# Patient Record
Sex: FEMALE | Race: WHITE | NOT HISPANIC OR LATINO
[De-identification: names, ages, dates, MRNs, and addresses within clinical notes are randomized per-mention and may not be internally consistent; named-entity substitution may affect disease eponyms.]

---

## 2021-10-11 PROBLEM — Z00.00 ENCOUNTER FOR PREVENTIVE HEALTH EXAMINATION: Status: ACTIVE | Noted: 2021-10-11

## 2021-11-30 ENCOUNTER — APPOINTMENT (OUTPATIENT)
Dept: RHEUMATOLOGY | Facility: CLINIC | Age: 71
End: 2021-11-30
Payer: COMMERCIAL

## 2021-11-30 ENCOUNTER — NON-APPOINTMENT (OUTPATIENT)
Age: 71
End: 2021-11-30

## 2021-11-30 VITALS
HEART RATE: 72 BPM | DIASTOLIC BLOOD PRESSURE: 80 MMHG | WEIGHT: 127 LBS | BODY MASS INDEX: 25.27 KG/M2 | RESPIRATION RATE: 15 BRPM | SYSTOLIC BLOOD PRESSURE: 120 MMHG | OXYGEN SATURATION: 97 % | HEIGHT: 59.5 IN | TEMPERATURE: 97.2 F

## 2021-11-30 DIAGNOSIS — M25.511 PAIN IN RIGHT SHOULDER: ICD-10-CM

## 2021-11-30 DIAGNOSIS — M65.9 SYNOVITIS AND TENOSYNOVITIS, UNSPECIFIED: ICD-10-CM

## 2021-11-30 DIAGNOSIS — S73.192S OTHER SPRAIN OF LEFT HIP, SEQUELA: ICD-10-CM

## 2021-11-30 PROCEDURE — 99204 OFFICE O/P NEW MOD 45 MIN: CPT

## 2021-12-01 PROBLEM — S73.192S TEAR OF LEFT ACETABULAR LABRUM, SEQUELA: Status: ACTIVE | Noted: 2021-12-01

## 2021-12-01 RX ORDER — AZELASTINE HYDROCHLORIDE 137 UG/1
0.1 SPRAY, METERED NASAL
Qty: 30 | Refills: 0 | Status: COMPLETED | COMMUNITY
Start: 2021-07-07

## 2021-12-01 RX ORDER — PANTOPRAZOLE 40 MG/1
40 TABLET, DELAYED RELEASE ORAL
Qty: 90 | Refills: 0 | Status: ACTIVE | COMMUNITY
Start: 2021-11-10

## 2021-12-01 RX ORDER — METHYLPREDNISOLONE 4 MG/1
4 TABLET ORAL
Qty: 90 | Refills: 0 | Status: DISCONTINUED | COMMUNITY
Start: 2021-10-19

## 2021-12-01 RX ORDER — METOPROLOL SUCCINATE 25 MG/1
25 TABLET, EXTENDED RELEASE ORAL
Qty: 270 | Refills: 0 | Status: ACTIVE | COMMUNITY
Start: 2021-11-10

## 2021-12-01 RX ORDER — LEVOTHYROXINE SODIUM 50 UG/1
50 TABLET ORAL
Qty: 90 | Refills: 0 | Status: ACTIVE | COMMUNITY
Start: 2021-11-11

## 2021-12-01 RX ORDER — ALPRAZOLAM 0.25 MG/1
0.25 TABLET ORAL
Qty: 45 | Refills: 0 | Status: ACTIVE | COMMUNITY
Start: 2021-11-02

## 2021-12-01 RX ORDER — DULOXETINE HYDROCHLORIDE 30 MG/1
30 CAPSULE, DELAYED RELEASE PELLETS ORAL
Qty: 90 | Refills: 0 | Status: ACTIVE | COMMUNITY
Start: 2021-10-19

## 2021-12-01 RX ORDER — DENOSUMAB 60 MG/ML
60 INJECTION SUBCUTANEOUS
Qty: 1 | Refills: 0 | Status: ACTIVE | COMMUNITY
Start: 2021-09-09

## 2021-12-01 RX ORDER — METOPROLOL SUCCINATE 200 MG/1
200 TABLET, EXTENDED RELEASE ORAL
Qty: 30 | Refills: 0 | Status: ACTIVE | COMMUNITY
Start: 2021-11-17

## 2021-12-01 NOTE — ASSESSMENT
[FreeTextEntry1] : KRISHNA DÍAZ is a 71 year old woman who presents with chronic seronegative, reportedly nonerosive RA who has been maintained for the last 10 years on IV high dose Orencia (reportedly not responsive to lower doses) and low dose Medrol 2mg. Currently off Orencia x 3 months with some active synovitis. + steroid induced OP, s/p Prolia recently. + L hip pain s/p prior labral tear. \par \par - c/w medrol 2mg/day for now\par - reports XR this year, will get [facility] US now to eval for subclinical synovitis as she has not been as symptomatic as she expected being off orencia x 3 months but does have some synovitis -- perhaps we can try a lower dose of Orencia pending imaging. \par - check labs as below including pre immunosuppression labs: Hepatitis panel, Quantiferon \par - check Vit D, c/w dietary Ca goal 600mg BID with food, Vit D 2000 IU daily. C/w weight bearing exercise for 30min 3-4x/week to maintain BMD \par - will get DEXA for review\par - next Prolia due Spring 2022\par - RTC in 6-8 weeks to review

## 2021-12-01 NOTE — REVIEW OF SYSTEMS
[Negative] : Heme/Lymph [As Noted in HPI] : as noted in HPI [Arthralgias] : arthralgias [Joint Pain] : joint pain [Joint Swelling] : joint swelling [Joint Stiffness] : joint stiffness

## 2021-12-01 NOTE — HISTORY OF PRESENT ILLNESS
[FreeTextEntry1] : KRISHNA DÍAZ is a 71 year old woman who presents with prior dx of RA since age 27, presently on Medrol 2mg/day (chronically on this dose, has not been able to taper lower) and Orencia infusions x 10 years at dose of 1000mg, last dose 3 months as reports insurance has not approved the renewal of the PA. Reports poor response to SQ orencia and lower doses of IV orencia in the past. Denies any SE, no infectious sx with this dose. Denies any severe flares in thel last few years but does report she feels worsening arthralgias in the week just prior to infusions.  Currently reports all day stiffness and some arthralgias in R 3rd PIP, R shoulder, b/l wrists, b/l elbows, b/l knees. R 3rd PIP is more swollen than normal.  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 No personal fractures, no parental fx, no tobacco/ no ETOH / + chronic steroids/ + RA. \par Steady on feet, walking for exercise, dietary Ca, daily Vit D 2K. \par \par + L hip labral tear, some chronic MSK related pain, no limitation to ambulation \par + ocular migraines and some double vision in b/l eyes intermittently x 1 year -- never had inflammatory eye disease \par + ?"scarring on lung from RA" but never changed meds for this, no current pulmonary sx \par + b/l tinnitus \par

## 2021-12-01 NOTE — PHYSICAL EXAM
[General Appearance - Alert] : alert [General Appearance - In No Acute Distress] : in no acute distress [General Appearance - Well Nourished] : well nourished [Oriented To Time, Place, And Person] : oriented to person, place, and time [Impaired Insight] : insight and judgment were intact [Affect] : the affect was normal [Abnormal Walk] : normal gait [Nail Clubbing] : no clubbing  or cyanosis of the fingernails [Motor Tone] : muscle strength and tone were normal [Skin Color & Pigmentation] : normal skin color and pigmentation [Motor Exam] : the motor exam was normal [No Focal Deficits] : no focal deficits [Sclera] : the sclera and conjunctiva were normal [PERRL With Normal Accommodation] : pupils were equal in size, round, and reactive to light [Extraocular Movements] : extraocular movements were intact [Outer Ear] : the ears and nose were normal in appearance [Oropharynx] : the oropharynx was normal [Neck Appearance] : the appearance of the neck was normal [] : no respiratory distress [Auscultation Breath Sounds / Voice Sounds] : lungs were clear to auscultation bilaterally [Heart Rate And Rhythm] : heart rate was normal and rhythm regular [Heart Sounds] : normal S1 and S2 [Edema] : there was no peripheral edema [Bowel Sounds] : normal bowel sounds [Abdomen Soft] : soft [Abdomen Tenderness] : non-tender [No CVA Tenderness] : no ~M costovertebral angle tenderness [No Spinal Tenderness] : no spinal tenderness [FreeTextEntry1] : Synovitis over scattered PIPs in b/l hands, some TTP over MCPs and wrists, R shoulder TTP but ROM intact. OA changes in knees, no effusion. Some mild chronic RA deformities. L hip ROM painful but full

## 2022-01-18 ENCOUNTER — APPOINTMENT (OUTPATIENT)
Dept: RHEUMATOLOGY | Facility: CLINIC | Age: 72
End: 2022-01-18
Payer: COMMERCIAL

## 2022-01-18 VITALS
OXYGEN SATURATION: 98 % | HEART RATE: 70 BPM | RESPIRATION RATE: 14 BRPM | TEMPERATURE: 97.3 F | WEIGHT: 126 LBS | DIASTOLIC BLOOD PRESSURE: 71 MMHG | SYSTOLIC BLOOD PRESSURE: 107 MMHG | BODY MASS INDEX: 25.06 KG/M2 | HEIGHT: 59.5 IN

## 2022-01-18 PROCEDURE — 99213 OFFICE O/P EST LOW 20 MIN: CPT

## 2022-01-18 NOTE — PHYSICAL EXAM
[General Appearance - Alert] : alert [General Appearance - In No Acute Distress] : in no acute distress [General Appearance - Well Nourished] : well nourished [Sclera] : the sclera and conjunctiva were normal [PERRL With Normal Accommodation] : pupils were equal in size, round, and reactive to light [Extraocular Movements] : extraocular movements were intact [Outer Ear] : the ears and nose were normal in appearance [Oropharynx] : the oropharynx was normal [Neck Appearance] : the appearance of the neck was normal [Auscultation Breath Sounds / Voice Sounds] : lungs were clear to auscultation bilaterally [Heart Rate And Rhythm] : heart rate was normal and rhythm regular [Heart Sounds] : normal S1 and S2 [Edema] : there was no peripheral edema [Bowel Sounds] : normal bowel sounds [Abdomen Soft] : soft [Abdomen Tenderness] : non-tender [No CVA Tenderness] : no ~M costovertebral angle tenderness [No Spinal Tenderness] : no spinal tenderness [Abnormal Walk] : normal gait [Nail Clubbing] : no clubbing  or cyanosis of the fingernails [Motor Tone] : muscle strength and tone were normal [Skin Color & Pigmentation] : normal skin color and pigmentation [] : no rash [Motor Exam] : the motor exam was normal [No Focal Deficits] : no focal deficits [Oriented To Time, Place, And Person] : oriented to person, place, and time [Impaired Insight] : insight and judgment were intact [Affect] : the affect was normal [Murmurs] : no murmurs [FreeTextEntry1] : No synovitis today, fist ROM intact but  is slightly weak b/l. OA changes in knees, no effusion. Some mild chronic RA deformities. L hip ROM painful but full

## 2022-01-18 NOTE — ASSESSMENT
[FreeTextEntry1] : KRISHNA DÍAZ is a 71 year old woman who presents with chronic seronegative, reportedly nonerosive RA who has been maintained for the last 10 years on IV high dose Orencia (reportedly not responsive to lower doses) and low dose Medrol 2mg. Currently off Orencia, no active sx today, prior synovitis appears resolved. \par \par + steroid induced OP, s/p Prolia last in Oct 2021 \par + L hip pain s/p prior labral tear \par \par - c/w medrol 2mg/day for now\par - reviewed labs and ESR/CRP normal and given no progression of sx would not resume Orencia at present -- pt is amenable to this\par - had [facility]K US but report not sent over, will review as this may change above plan \par - c/w dietary Ca goal 600mg BID with food, Vit D 2000 IU daily. \par - C/w weight bearing exercise for 30min 3-4x/week to maintain BMD \par - will get most recent DEXA for review\par - next Prolia due after April 18, 2022 \par - RTC in 3 months for f/u and Prolia

## 2022-01-18 NOTE — REASON FOR VISIT
[Follow-Up: _____] : a [unfilled] follow-up visit [Initial Evaluation] : an initial evaluation [FreeTextEntry1] : RA, OP

## 2022-04-11 ENCOUNTER — EMERGENCY (EMERGENCY)
Facility: HOSPITAL | Age: 72
LOS: 1 days | Discharge: HOME | End: 2022-04-11
Attending: EMERGENCY MEDICINE | Admitting: EMERGENCY MEDICINE
Payer: COMMERCIAL

## 2022-04-11 VITALS
OXYGEN SATURATION: 94 % | TEMPERATURE: 98 F | SYSTOLIC BLOOD PRESSURE: 105 MMHG | DIASTOLIC BLOOD PRESSURE: 70 MMHG | HEART RATE: 70 BPM | RESPIRATION RATE: 18 BRPM

## 2022-04-11 VITALS
OXYGEN SATURATION: 97 % | WEIGHT: 123.02 LBS | HEART RATE: 70 BPM | HEIGHT: 59 IN | RESPIRATION RATE: 18 BRPM | TEMPERATURE: 98 F | SYSTOLIC BLOOD PRESSURE: 124 MMHG | DIASTOLIC BLOOD PRESSURE: 84 MMHG

## 2022-04-11 PROCEDURE — 73502 X-RAY EXAM HIP UNI 2-3 VIEWS: CPT | Mod: 26,LT

## 2022-04-11 PROCEDURE — 73562 X-RAY EXAM OF KNEE 3: CPT | Mod: 26,RT

## 2022-04-11 PROCEDURE — 70450 CT HEAD/BRAIN W/O DYE: CPT | Mod: 26,MA

## 2022-04-11 PROCEDURE — 73562 X-RAY EXAM OF KNEE 3: CPT

## 2022-04-11 PROCEDURE — 72100 X-RAY EXAM L-S SPINE 2/3 VWS: CPT

## 2022-04-11 PROCEDURE — 72125 CT NECK SPINE W/O DYE: CPT | Mod: MA

## 2022-04-11 PROCEDURE — 73110 X-RAY EXAM OF WRIST: CPT | Mod: 26,LT

## 2022-04-11 PROCEDURE — 72100 X-RAY EXAM L-S SPINE 2/3 VWS: CPT | Mod: 26

## 2022-04-11 PROCEDURE — 99284 EMERGENCY DEPT VISIT MOD MDM: CPT | Mod: 25

## 2022-04-11 PROCEDURE — 99285 EMERGENCY DEPT VISIT HI MDM: CPT

## 2022-04-11 PROCEDURE — 73502 X-RAY EXAM HIP UNI 2-3 VIEWS: CPT

## 2022-04-11 PROCEDURE — 72125 CT NECK SPINE W/O DYE: CPT | Mod: 26,MA

## 2022-04-11 PROCEDURE — 73110 X-RAY EXAM OF WRIST: CPT

## 2022-04-11 PROCEDURE — 70450 CT HEAD/BRAIN W/O DYE: CPT | Mod: MA

## 2022-04-11 RX ORDER — ACETAMINOPHEN 500 MG
650 TABLET ORAL ONCE
Refills: 0 | Status: COMPLETED | OUTPATIENT
Start: 2022-04-11 | End: 2022-04-11

## 2022-04-11 RX ORDER — IBUPROFEN 200 MG
400 TABLET ORAL ONCE
Refills: 0 | Status: COMPLETED | OUTPATIENT
Start: 2022-04-11 | End: 2022-04-11

## 2022-04-11 RX ADMIN — Medication 650 MILLIGRAM(S): at 22:09

## 2022-04-11 NOTE — ED ADULT NURSE NOTE - OBJECTIVE STATEMENT
Pt tripped over flat grown and landed on her Kness. Right knee hurts the most and left wrist. Pt hit  left side of face small bruise noted. Pt denies LOC, taking blood thinners.

## 2022-04-11 NOTE — ED PROVIDER NOTE - OBJECTIVE STATEMENT
Pt is a 71 yo female BIBEMS s/p fall pt tripped and fell at home goods while turning and hit left side of face hip wrist and right knee no loc no blood thinners no numbness tingling no weakness.

## 2022-04-11 NOTE — ED PROVIDER NOTE - NS ED ATTENDING STATEMENT MOD
This was a shared visit with the MARLENE. I reviewed and verified the documentation and independently performed the documented:

## 2022-04-11 NOTE — ED PROVIDER NOTE - CLINICAL SUMMARY MEDICAL DECISION MAKING FREE TEXT BOX
Pt is a 71 yo female s/p fall will ct head neck xray Tylenol for pain Pt is a 73 yo female s/p fall will ct head neck xray Tylenol for pain    DT: I have personally performed a face to face diagnostic evaluation on this patient.  I have reviewed the PA's note and agree with the history, exam, and plan of care, except as noted.  History and Exam by me shows Pt is a 73 yo female BIBEMS s/p fall pt tripped and fell at home goods while turning and hit left side of face hip wrist and right knee no loc no blood thinners no numbness tingling no weakness. .  Patient is NAD.  A n O x 3. Head NC/Left forehead quarter size ecchymosis.. Abd-soft, nt, no guarding, no rebound, no distension, no cva tenderness. Ext- Left wrist - limited rom due to pain and swelling on lateral aspect.  right knee- ecchymosis c limited rom.  left hip- from, nt, no erythema.   xrays prelim neg. cts were unremarkable.

## 2022-04-11 NOTE — ED PROVIDER NOTE - NSFOLLOWUPINSTRUCTIONS_ED_ALL_ED_FT
Follow up with orthopedics  elevate ice compress  tylenol for pain  returnt to er for any worsening symptoms    WRIST SPRAIN - General Information           Wrist Sprain    WHAT YOU NEED TO KNOW:    What is a wrist sprain? A wrist sprain happens when one or more ligaments in your wrist stretch or tear. Ligaments are tough tissues that connect bones and keep them in place, and support your joints.    What are the signs and symptoms of a wrist sprain?   •Swelling and tenderness      •Pain and stiffness      •Bruising or changes in skin color      •Popping sound in your wrist when you move it      How is a wrist sprain diagnosed? Your healthcare provider will ask how you injured your wrist. The provider will examine your wrist and hand and ask about your symptoms. You may need x-rays, an MRI, or a CT scan of your wrist. You may be given contrast liquid to help the pictures show up better. Tell the healthcare provider if you have ever had an allergic reaction to contrast liquid. Do not enter the MRI room with anything metal. Metal can cause serious injury. Tell the healthcare provider if you have any metal in or on your body.    How is a wrist sprain treated? Treatment depends on how severe your sprain is. You may need any of the following:   •NSAIDs, such as ibuprofen, help decrease swelling, pain, and fever. NSAIDs can cause stomach bleeding or kidney problems in certain people. If you take blood thinner medicine, always ask your healthcare provider if NSAIDs are safe for you. Always read the medicine label and follow directions.      •Acetaminophen decreases pain and fever. It is available without a doctor's order. Ask how much to take and how often to take it. Follow directions. Read the labels of all other medicines you are using to see if they also contain acetaminophen, or ask your doctor or pharmacist. Acetaminophen can cause liver damage if not taken correctly. Do not use more than 4 grams (4,000 milligrams) total of acetaminophen in one day.       •A splint or cast helps support your wrist and prevent more damage.      •Surgery may be needed if you have a severe sprain. Arthroscopy may be done to examine the inside of your wrist joint and repair ligament damage. Arthroscopy uses a scope that is inserted through a small incision. You may need open surgery to reconnect torn ligaments to the bone.      •Physical therapy may be recommended. A physical therapist teaches you exercises to help improve movement and strength, and to decrease pain.      How can I manage my symptoms?   •Rest your wrist for at least 48 hours. Avoid activities that cause pain.      •Ice your wrist for 15 to 20 minutes every hour or as directed. Use an ice pack, or put crushed ice in a plastic bag. Cover it with a towel before you put it on your wrist. Ice helps prevent tissue damage and decreases swelling and pain.      •Compress your wrist with an elastic bandage. This will help decrease swelling, support your wrist, and help it heal. Wear your wrist wrap as directed. The elastic bandage should be snug but not tight.  How to Wrap an Elastic Bandage           •Elevate your wrist above the level of your heart as often as you can. This will help decrease swelling and pain. Prop your wrist on pillows or blankets to keep it elevated comfortably.             When should I seek immediate care?   •You have severe pain or swelling.      •Your injured wrist is red or has red streaks spreading from the injured area.      •You have new trouble moving your hands, fingers, or wrist.      •Your wrist, hand, or fingers feel cold or numb.      •Your fingernails turn blue or gray.      When should I call my doctor?   •Your symptoms get worse.      •Your sprain does not get better within 2 weeks.      •You have questions or concerns about your condition or care.      CARE AGREEMENT:    You have the right to help plan your care. Learn about your health condition and how it may be treated. Discuss treatment options with your healthcare providers to decide what care you want to receive. You always have the right to refuse treatment.        © Copyright [employer] 2022           back to top                          © Copyright [employer] 2022 Follow up with orthopedics  elevate ice compress  tylenol for pain  return to er for any worsening symptoms    WRIST SPRAIN - General Information           Wrist Sprain    WHAT YOU NEED TO KNOW:    What is a wrist sprain? A wrist sprain happens when one or more ligaments in your wrist stretch or tear. Ligaments are tough tissues that connect bones and keep them in place, and support your joints.    What are the signs and symptoms of a wrist sprain?   •Swelling and tenderness      •Pain and stiffness      •Bruising or changes in skin color      •Popping sound in your wrist when you move it      How is a wrist sprain diagnosed? Your healthcare provider will ask how you injured your wrist. The provider will examine your wrist and hand and ask about your symptoms. You may need x-rays, an MRI, or a CT scan of your wrist. You may be given contrast liquid to help the pictures show up better. Tell the healthcare provider if you have ever had an allergic reaction to contrast liquid. Do not enter the MRI room with anything metal. Metal can cause serious injury. Tell the healthcare provider if you have any metal in or on your body.    How is a wrist sprain treated? Treatment depends on how severe your sprain is. You may need any of the following:   •NSAIDs, such as ibuprofen, help decrease swelling, pain, and fever. NSAIDs can cause stomach bleeding or kidney problems in certain people. If you take blood thinner medicine, always ask your healthcare provider if NSAIDs are safe for you. Always read the medicine label and follow directions.      •Acetaminophen decreases pain and fever. It is available without a doctor's order. Ask how much to take and how often to take it. Follow directions. Read the labels of all other medicines you are using to see if they also contain acetaminophen, or ask your doctor or pharmacist. Acetaminophen can cause liver damage if not taken correctly. Do not use more than 4 grams (4,000 milligrams) total of acetaminophen in one day.       •A splint or cast helps support your wrist and prevent more damage.      •Surgery may be needed if you have a severe sprain. Arthroscopy may be done to examine the inside of your wrist joint and repair ligament damage. Arthroscopy uses a scope that is inserted through a small incision. You may need open surgery to reconnect torn ligaments to the bone.      •Physical therapy may be recommended. A physical therapist teaches you exercises to help improve movement and strength, and to decrease pain.      How can I manage my symptoms?   •Rest your wrist for at least 48 hours. Avoid activities that cause pain.      •Ice your wrist for 15 to 20 minutes every hour or as directed. Use an ice pack, or put crushed ice in a plastic bag. Cover it with a towel before you put it on your wrist. Ice helps prevent tissue damage and decreases swelling and pain.      •Compress your wrist with an elastic bandage. This will help decrease swelling, support your wrist, and help it heal. Wear your wrist wrap as directed. The elastic bandage should be snug but not tight.  How to Wrap an Elastic Bandage           •Elevate your wrist above the level of your heart as often as you can. This will help decrease swelling and pain. Prop your wrist on pillows or blankets to keep it elevated comfortably.             When should I seek immediate care?   •You have severe pain or swelling.      •Your injured wrist is red or has red streaks spreading from the injured area.      •You have new trouble moving your hands, fingers, or wrist.      •Your wrist, hand, or fingers feel cold or numb.      •Your fingernails turn blue or gray.      When should I call my doctor?   •Your symptoms get worse.      •Your sprain does not get better within 2 weeks.      •You have questions or concerns about your condition or care.      CARE AGREEMENT:    You have the right to help plan your care. Learn about your health condition and how it may be treated. Discuss treatment options with your healthcare providers to decide what care you want to receive. You always have the right to refuse treatment.        © Copyright [employer] 2022           back to top                          © Copyright [employer] 2022 1. Take Tylenol 650mg every 4-6 hours for 5 days.      Follow up with orthopedics  elevate ice compress  tylenol for pain  return to er for any worsening symptoms    WRIST SPRAIN - General Information           Wrist Sprain    WHAT YOU NEED TO KNOW:    What is a wrist sprain? A wrist sprain happens when one or more ligaments in your wrist stretch or tear. Ligaments are tough tissues that connect bones and keep them in place, and support your joints.    What are the signs and symptoms of a wrist sprain?   •Swelling and tenderness      •Pain and stiffness      •Bruising or changes in skin color      •Popping sound in your wrist when you move it      How is a wrist sprain diagnosed? Your healthcare provider will ask how you injured your wrist. The provider will examine your wrist and hand and ask about your symptoms. You may need x-rays, an MRI, or a CT scan of your wrist. You may be given contrast liquid to help the pictures show up better. Tell the healthcare provider if you have ever had an allergic reaction to contrast liquid. Do not enter the MRI room with anything metal. Metal can cause serious injury. Tell the healthcare provider if you have any metal in or on your body.    How is a wrist sprain treated? Treatment depends on how severe your sprain is. You may need any of the following:   •NSAIDs, such as ibuprofen, help decrease swelling, pain, and fever. NSAIDs can cause stomach bleeding or kidney problems in certain people. If you take blood thinner medicine, always ask your healthcare provider if NSAIDs are safe for you. Always read the medicine label and follow directions.      •Acetaminophen decreases pain and fever. It is available without a doctor's order. Ask how much to take and how often to take it. Follow directions. Read the labels of all other medicines you are using to see if they also contain acetaminophen, or ask your doctor or pharmacist. Acetaminophen can cause liver damage if not taken correctly. Do not use more than 4 grams (4,000 milligrams) total of acetaminophen in one day.       •A splint or cast helps support your wrist and prevent more damage.      •Surgery may be needed if you have a severe sprain. Arthroscopy may be done to examine the inside of your wrist joint and repair ligament damage. Arthroscopy uses a scope that is inserted through a small incision. You may need open surgery to reconnect torn ligaments to the bone.      •Physical therapy may be recommended. A physical therapist teaches you exercises to help improve movement and strength, and to decrease pain.      How can I manage my symptoms?   •Rest your wrist for at least 48 hours. Avoid activities that cause pain.      •Ice your wrist for 15 to 20 minutes every hour or as directed. Use an ice pack, or put crushed ice in a plastic bag. Cover it with a towel before you put it on your wrist. Ice helps prevent tissue damage and decreases swelling and pain.      •Compress your wrist with an elastic bandage. This will help decrease swelling, support your wrist, and help it heal. Wear your wrist wrap as directed. The elastic bandage should be snug but not tight.  How to Wrap an Elastic Bandage           •Elevate your wrist above the level of your heart as often as you can. This will help decrease swelling and pain. Prop your wrist on pillows or blankets to keep it elevated comfortably.             When should I seek immediate care?   •You have severe pain or swelling.      •Your injured wrist is red or has red streaks spreading from the injured area.      •You have new trouble moving your hands, fingers, or wrist.      •Your wrist, hand, or fingers feel cold or numb.      •Your fingernails turn blue or gray.      When should I call my doctor?   •Your symptoms get worse.      •Your sprain does not get better within 2 weeks.      •You have questions or concerns about your condition or care.      CARE AGREEMENT:    You have the right to help plan your care. Learn about your health condition and how it may be treated. Discuss treatment options with your healthcare providers to decide what care you want to receive. You always have the right to refuse treatment.        © Copyright [employer] 2022           back to top                          © Copyright [employer] 2022

## 2022-04-11 NOTE — ED PROVIDER NOTE - PROGRESS NOTE DETAILS
no acute fracture placed in velco wrist splint for comfort ace wrap right knee f/u with orthopedics Patient informed that xray results are preliminary pending official report from the radiologist tomorrow.  All results were explained to patient and/or family and a copy of all available results given.  Daughter was present.

## 2022-04-11 NOTE — ED PROVIDER NOTE - RESPIRATORY, MLM
Breath sounds clear and equal bilaterally. Aria Murillo  PEDIATRICS  [phone] [place] [address], [address] [address]  Phone: [phone]  Fax: [phone]  Follow Up Time:

## 2022-04-11 NOTE — ED PROVIDER NOTE - CARE PLAN
Principal Discharge DX:	Fall  Secondary Diagnosis:	Left wrist pain  Secondary Diagnosis:	Back pain  Secondary Diagnosis:	Right knee pain   1

## 2022-04-11 NOTE — ED PROVIDER NOTE - MUSCULOSKELETAL, MLM
left wrist ttp no deformity pain with rom right knee ecchymposis pain with rom no deformity nvi right paralumbar ttp no midline pain normal straight leg raise nrom of hip nvi

## 2022-04-11 NOTE — ED ADULT TRIAGE NOTE - CHIEF COMPLAINT QUOTE
Pt had trip and fall, landed on left side, c/o left side of face, left hand and left hip pain, denies LOC, denies blood thinners.

## 2022-04-11 NOTE — ED PROVIDER NOTE - PATIENT PORTAL LINK FT
You can access the FollowMyHealth Patient Portal offered by [facility] by registering at the following website: [email or web address]. By joining [facility]’s FollowMyHealth portal, you will also be able to view your health information using other applications (apps) compatible with our system.

## 2022-04-11 NOTE — ED PROVIDER NOTE - WR INTERPRETATION DATE TIME  4
tender/bowel sounds hypoactive/guarding/distended/rigidity
distended
guarding/rigidity/tender/distended
distended
11-Apr-2022 21:59

## 2022-04-11 NOTE — ED PROVIDER NOTE - CONSTITUTIONAL, MLM
normal... Well appearing, awake, alert, oriented to person, place, time/situation and in no apparent distress. ttp left parietal area nrom of jaw

## 2022-04-11 NOTE — ED ADULT NURSE NOTE - NSICDXPASTMEDICALHX_GEN_ALL_CORE_FT
PAST MEDICAL HISTORY:  High cholesterol     History of hypertrophic cardiomyopathy     Hypothyroid     Rheumatoid arthritis

## 2022-04-11 NOTE — ED PROVIDER NOTE - CARE PROVIDER_API CALL
Jordy Cheek)  Orthopaedic Surgery  [address] [place] [address]  Phone: [phone]  Fax: [phone]  Follow Up Time:

## 2022-04-22 ENCOUNTER — APPOINTMENT (OUTPATIENT)
Dept: RHEUMATOLOGY | Facility: CLINIC | Age: 72
End: 2022-04-22
Payer: COMMERCIAL

## 2022-04-22 VITALS
TEMPERATURE: 96.3 F | HEIGHT: 59 IN | OXYGEN SATURATION: 100 % | WEIGHT: 124 LBS | HEART RATE: 70 BPM | BODY MASS INDEX: 25 KG/M2 | SYSTOLIC BLOOD PRESSURE: 101 MMHG | DIASTOLIC BLOOD PRESSURE: 70 MMHG | RESPIRATION RATE: 15 BRPM

## 2022-04-22 PROCEDURE — 99213 OFFICE O/P EST LOW 20 MIN: CPT | Mod: 25

## 2022-04-22 PROCEDURE — 96401 CHEMO ANTI-NEOPL SQ/IM: CPT

## 2022-04-22 RX ORDER — DENOSUMAB 60 MG/ML
60 INJECTION SUBCUTANEOUS
Qty: 1 | Refills: 0 | Status: COMPLETED | OUTPATIENT
Start: 2022-04-22

## 2022-04-22 RX ADMIN — DENOSUMAB 0 MG/ML: 60 INJECTION SUBCUTANEOUS at 00:00

## 2022-04-22 NOTE — ASSESSMENT
[FreeTextEntry1] : KRISHNA DÍAZ is a 72 year old woman with chronic seronegative, reportedly nonerosive RA who has been maintained for the last 10 years on IV high dose Orencia (reportedly not responsive to lower doses) and low dose Medrol 2mg. Currently off Orencia, no active sx today, prior synovitis appears resolved. 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 \par - C/w weight bearing exercise for 30min 3-4x/week to maintain BMD \par - repeat DEXA 2023 \par \par # L hip pain s/p prior labral tear \par - tylenol and ROM exercises are helpful at present so will continue\par - if worsening we discussed PT referral vs local injection as that helped in the past \par \par RTC in 3 months

## 2022-04-22 NOTE — HISTORY OF PRESENT ILLNESS
[FreeTextEntry1] : KRISHNA DÍAZ is a 72 year old woman who presents with prior dx of RA since age 27, presently on Medrol 2mg/day (chronically on this dose, has not been able to taper lower) and Orencia infusions x 10 years at dose of 1000mg, last dose 3 months as reports insurance has not approved the renewal of the PA. Reports poor response to SQ orencia and lower doses of IV orencia in the past. Denies any SE, no infectious sx with this dose. Denies any severe flares in thel last few years but does report she feels worsening arthralgias in the week just prior to infusions.  Currently reports all day stiffness and some arthralgias in R 3rd PIP, R shoulder, b/l wrists, b/l elbows, b/l knees. R 3rd PIP is more swollen than normal.  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 No personal fractures, no parental fx, no tobacco/ no ETOH / + chronic steroids/ + RA. \par Steady on feet, walking for exercise, dietary Ca, daily Vit D 2K. \par \par + L hip labral tear, some chronic MSK related pain, no limitation to ambulation \par + ocular migraines and some double vision in b/l eyes intermittently x 1 year -- never had inflammatory eye disease \par + ?"scarring on lung from RA" but never changed meds for this, no current pulmonary sx \par + b/l tinnitus \par \par ---------\par 1/18/22 -- Since last visit some loss of hand  strength but moreso 2/2 thumb CMC pain. No active synovitis, effusions, prolonged stiffness, no extra-articular inflammatory sx. No falls, exercising with walking more frequently. Feels well otherwise. \par \par 4/22/22 -- Some finger pain and stiffness, no synovitis, some increased fatigue but able to do all ADLs. Had a traumatic fall 2 weeks ago, no fractures, diffuse myalgias are improving. It did worsen her OA related hip pain but she is able to ambulate and tylenol is helping. No other falls, steady on her feet usually, no plans for upcoming dental work.

## 2022-04-22 NOTE — REVIEW OF SYSTEMS
[Arthralgias] : arthralgias [Joint Pain] : joint pain [As Noted in HPI] : as noted in HPI [Feeling Tired] : feeling tired [Joint Swelling] : no joint swelling [Joint Stiffness] : no joint stiffness [Negative] : Eyes

## 2022-07-22 ENCOUNTER — APPOINTMENT (OUTPATIENT)
Dept: RHEUMATOLOGY | Facility: CLINIC | Age: 72
End: 2022-07-22

## 2022-07-22 VITALS
TEMPERATURE: 97.1 F | DIASTOLIC BLOOD PRESSURE: 70 MMHG | HEART RATE: 69 BPM | WEIGHT: 124 LBS | HEIGHT: 59 IN | BODY MASS INDEX: 25 KG/M2 | OXYGEN SATURATION: 98 % | SYSTOLIC BLOOD PRESSURE: 110 MMHG | RESPIRATION RATE: 15 BRPM

## 2022-07-22 DIAGNOSIS — M25.511 PAIN IN RIGHT SHOULDER: ICD-10-CM

## 2022-07-22 DIAGNOSIS — G89.29 PAIN IN RIGHT SHOULDER: ICD-10-CM

## 2022-07-22 PROCEDURE — 99213 OFFICE O/P EST LOW 20 MIN: CPT

## 2022-07-22 RX ORDER — METHYLPREDNISOLONE 4 MG/1
4 TABLET ORAL
Refills: 0 | Status: DISCONTINUED | COMMUNITY
End: 2022-07-22

## 2022-07-22 NOTE — HISTORY OF PRESENT ILLNESS
[FreeTextEntry1] : KRISHNA DÍAZ is a 72 year old woman who presents with prior dx of RA since age 27, presently on Medrol 2mg/day (chronically on this dose, has not been able to taper lower) and Orencia infusions x 10 years at dose of 1000mg, last dose 3 months as reports insurance has not approved the renewal of the PA. Reports poor response to SQ orencia and lower doses of IV orencia in the past. Denies any SE, no infectious sx with this dose. Denies any severe flares in thel last few years but does report she feels worsening arthralgias in the week just prior to infusions.  Currently reports all day stiffness and some arthralgias in R 3rd PIP, R shoulder, b/l wrists, b/l elbows, b/l knees. R 3rd PIP is more swollen than normal.  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 No personal fractures, no parental fx, no tobacco/ no ETOH / + chronic steroids/ + RA. \par Steady on feet, walking for exercise, dietary Ca, daily Vit D 2K. \par \par + L hip labral tear, some chronic MSK related pain, no limitation to ambulation \par + ocular migraines and some double vision in b/l eyes intermittently x 1 year -- never had inflammatory eye disease \par + ?"scarring on lung from RA" but never changed meds for this, no current pulmonary sx \par + b/l tinnitus \par \par CT T spine - no overt abnormality, no fx \par \par ---------\par 1/18/22 -- Since last visit some loss of hand  strength but moreso 2/2 thumb CMC pain. No active synovitis, effusions, prolonged stiffness, no extra-articular inflammatory sx. No falls, exercising with walking more frequently. Feels well otherwise. \par \par 4/22/22 -- Some finger pain and stiffness, no synovitis, some increased fatigue but able to do all ADLs. Had a traumatic fall 2 weeks ago, no fractures, diffuse myalgias are improving. It did worsen her OA related hip pain but she is able to ambulate and tylenol is helping. No other falls, steady on her feet usually, no plans for upcoming dental work. \par \par 7/22/22 -- Fluctuating R sided mid back pain with radiation to front, when very severe Tylenol and muscle relaxer does not help. Some worsening b/l hip and GTB pain as well as b/l 3rd PIP with milder generalized b/l hand all day achy pain. R shoulder pain with rotating behind her back only.

## 2022-07-22 NOTE — REVIEW OF SYSTEMS
[As Noted in HPI] : as noted in HPI [Arthralgias] : arthralgias [Joint Pain] : joint pain [Joint Swelling] : no joint swelling [Joint Stiffness] : no joint stiffness [Negative] : Constitutional

## 2022-07-22 NOTE — ASSESSMENT
[FreeTextEntry1] : KRISHNA DÍAZ is a 72 year old woman with chronic seronegative, reportedly nonerosive RA who has been maintained for the last 10 years on IV high dose Orencia (reportedly not responsive to lower doses) and low dose Medrol 2mg. Currently off Orencia, no active sx today, prior synovitis appears resolved. \par \par # seronegative RA, nonerosive on imaging recently, worsening symmetrical joint pain since last visit raising suspicion of activity \par - c/w medrol 2mg/day for now, prn 4mg if worsening pain, if starts needing to use daily to let me know \par - reviewed last labs with her, stable at that time. Will repeat now, check pre immunosuppression labs: Hepatitis panel, Quantiferon \par - recommend to resume Orencia at present - she had poor response to SQ formulation in the past, so will get PA for infusion \par \par # steroid induced OP, s/p Prolia \par - I do not think the back pain is related as a SE \par - next dose due late Oct \par - c/w dietary Ca goal 600mg BID with food, Vit D 2000 IU daily. \par - C/w weight bearing exercise for 30min 3-4x/week to maintain BMD \par - repeat DEXA 2023 \par \par # L hip pain s/p prior labral tear, R shoulder suspected OA related pain, thoracic spasm \par - tylenol and ROM exercises are helpful at present so will continue\par - reviewed use of heating pad and massage for spasm \par - PT referral if above not improving sx \par \par RTC in 3 months

## 2022-07-22 NOTE — PHYSICAL EXAM
[General Appearance - Alert] : alert [General Appearance - In No Acute Distress] : in no acute distress [General Appearance - Well Nourished] : well nourished [Sclera] : the sclera and conjunctiva were normal [PERRL With Normal Accommodation] : pupils were equal in size, round, and reactive to light [Extraocular Movements] : extraocular movements were intact [Outer Ear] : the ears and nose were normal in appearance [Oropharynx] : the oropharynx was normal [Neck Appearance] : the appearance of the neck was normal [Auscultation Breath Sounds / Voice Sounds] : lungs were clear to auscultation bilaterally [Heart Rate And Rhythm] : heart rate was normal and rhythm regular [Heart Sounds] : normal S1 and S2 [Murmurs] : no murmurs [Edema] : there was no peripheral edema [Abdomen Soft] : soft [Abdomen Tenderness] : non-tender [Abnormal Walk] : normal gait [Nail Clubbing] : no clubbing  or cyanosis of the fingernails [Motor Tone] : muscle strength and tone were normal [] : no rash [No Focal Deficits] : no focal deficits [Oriented To Time, Place, And Person] : oriented to person, place, and time [Impaired Insight] : insight and judgment were intact [Affect] : the affect was normal [No CVA Tenderness] : no ~M costovertebral angle tenderness [No Spinal Tenderness] : no spinal tenderness [FreeTextEntry1] : No synovitis but TTP over b/l MCPs/PIPs and wrists, fist ROM intact. OA changes in knees, no effusion. Some mild chronic RA deformities. b/l mild GTB pain, L hip pain with ROM, R shoulder ROM intact but pain with rotating behind back

## 2022-10-04 ENCOUNTER — APPOINTMENT (OUTPATIENT)
Dept: RHEUMATOLOGY | Facility: CLINIC | Age: 72
End: 2022-10-04

## 2022-10-04 VITALS
TEMPERATURE: 98 F | SYSTOLIC BLOOD PRESSURE: 103 MMHG | DIASTOLIC BLOOD PRESSURE: 68 MMHG | OXYGEN SATURATION: 96 % | RESPIRATION RATE: 17 BRPM | HEART RATE: 66 BPM

## 2022-10-04 VITALS
TEMPERATURE: 98 F | DIASTOLIC BLOOD PRESSURE: 65 MMHG | SYSTOLIC BLOOD PRESSURE: 96 MMHG | HEART RATE: 67 BPM | OXYGEN SATURATION: 99 % | RESPIRATION RATE: 18 BRPM

## 2022-10-04 PROBLEM — E78.00 PURE HYPERCHOLESTEROLEMIA, UNSPECIFIED: Chronic | Status: ACTIVE | Noted: 2022-04-11

## 2022-10-04 PROBLEM — E03.9 HYPOTHYROIDISM, UNSPECIFIED: Chronic | Status: ACTIVE | Noted: 2022-04-11

## 2022-10-04 PROBLEM — M06.9 RHEUMATOID ARTHRITIS, UNSPECIFIED: Chronic | Status: ACTIVE | Noted: 2022-04-11

## 2022-10-04 PROBLEM — Z86.79 PERSONAL HISTORY OF OTHER DISEASES OF THE CIRCULATORY SYSTEM: Chronic | Status: ACTIVE | Noted: 2022-04-11

## 2022-10-04 PROCEDURE — 96413 CHEMO IV INFUSION 1 HR: CPT

## 2022-10-04 RX ORDER — ACETAMINOPHEN 325 MG/1
325 TABLET ORAL
Qty: 0 | Refills: 0 | Status: COMPLETED | OUTPATIENT
Start: 2022-09-28

## 2022-10-04 RX ORDER — ABATACEPT 250 MG/15ML
250 INJECTION, POWDER, LYOPHILIZED, FOR SOLUTION INTRAVENOUS
Qty: 0 | Refills: 0 | Status: COMPLETED | OUTPATIENT
Start: 2022-09-28

## 2022-10-25 ENCOUNTER — APPOINTMENT (OUTPATIENT)
Dept: RHEUMATOLOGY | Facility: CLINIC | Age: 72
End: 2022-10-25

## 2022-11-01 ENCOUNTER — APPOINTMENT (OUTPATIENT)
Dept: RHEUMATOLOGY | Facility: CLINIC | Age: 72
End: 2022-11-01

## 2022-11-01 VITALS
OXYGEN SATURATION: 99 % | DIASTOLIC BLOOD PRESSURE: 69 MMHG | SYSTOLIC BLOOD PRESSURE: 104 MMHG | HEART RATE: 70 BPM | TEMPERATURE: 96.4 F | RESPIRATION RATE: 18 BRPM

## 2022-11-01 PROCEDURE — ZZZZZ: CPT

## 2022-11-01 PROCEDURE — 96401 CHEMO ANTI-NEOPL SQ/IM: CPT

## 2022-11-01 PROCEDURE — 96413 CHEMO IV INFUSION 1 HR: CPT

## 2022-11-01 RX ORDER — ABATACEPT 250 MG/15ML
250 INJECTION, POWDER, LYOPHILIZED, FOR SOLUTION INTRAVENOUS
Qty: 0 | Refills: 0 | Status: COMPLETED | OUTPATIENT
Start: 2022-10-26

## 2022-11-01 RX ORDER — DENOSUMAB 60 MG/ML
60 INJECTION SUBCUTANEOUS
Qty: 1 | Refills: 0 | Status: COMPLETED | OUTPATIENT
Start: 2022-10-31

## 2022-11-01 RX ORDER — ACETAMINOPHEN 325 MG/1
325 TABLET ORAL
Qty: 0 | Refills: 0 | Status: COMPLETED | OUTPATIENT
Start: 2022-10-26

## 2022-11-07 ENCOUNTER — APPOINTMENT (OUTPATIENT)
Dept: RHEUMATOLOGY | Facility: CLINIC | Age: 72
End: 2022-11-07

## 2022-11-29 ENCOUNTER — APPOINTMENT (OUTPATIENT)
Dept: RHEUMATOLOGY | Facility: CLINIC | Age: 72
End: 2022-11-29

## 2022-11-29 VITALS
RESPIRATION RATE: 18 BRPM | DIASTOLIC BLOOD PRESSURE: 78 MMHG | OXYGEN SATURATION: 97 % | SYSTOLIC BLOOD PRESSURE: 123 MMHG | HEART RATE: 69 BPM | TEMPERATURE: 96.2 F

## 2022-11-29 VITALS
HEART RATE: 69 BPM | RESPIRATION RATE: 18 BRPM | SYSTOLIC BLOOD PRESSURE: 111 MMHG | OXYGEN SATURATION: 95 % | DIASTOLIC BLOOD PRESSURE: 74 MMHG

## 2022-11-29 PROCEDURE — 96413 CHEMO IV INFUSION 1 HR: CPT

## 2022-11-29 RX ORDER — ACETAMINOPHEN 325 MG/1
325 TABLET ORAL
Qty: 0 | Refills: 0 | Status: COMPLETED | OUTPATIENT
Start: 2022-11-23

## 2022-11-29 RX ORDER — ABATACEPT 250 MG/15ML
250 INJECTION, POWDER, LYOPHILIZED, FOR SOLUTION INTRAVENOUS
Qty: 0 | Refills: 0 | Status: COMPLETED | OUTPATIENT
Start: 2022-11-23

## 2022-11-29 NOTE — HISTORY OF PRESENT ILLNESS
[5] : 5 [Denies] : Denies [No] : No [Yes] : Yes [Declined] : Declined [TB] : Tuberculosis screening [Hep acute panel] : Hepatitis acute panel [Left upper extremity] : Left upper extremity [24g] : 24g [Start Time: ___] : Medication Start Time: [unfilled] [End Time: ___] : Medication End Time: [unfilled] [IV discontinued. Intact. No signs or symptoms of IV complications noted. Time: ___] : IV discontinued. Intact. No signs or symptoms of IV complications noted. Time: [unfilled] [Patient  instructed to seek medical attention with signs and symptoms of adverse effects] : Patient  instructed to seek medical attention with signs and symptoms of adverse effects [Patient left unit in no acute distress] : Patient left unit in no acute distress [Medications administered as ordered and tolerated well.] : Medications administered as ordered and tolerated well. [de-identified] : Reports pain to fingers bilaterally. No swelling reported.  [de-identified] : 09:05 am

## 2022-12-12 ENCOUNTER — APPOINTMENT (OUTPATIENT)
Dept: RHEUMATOLOGY | Facility: CLINIC | Age: 72
End: 2022-12-12

## 2022-12-27 ENCOUNTER — APPOINTMENT (OUTPATIENT)
Dept: RHEUMATOLOGY | Facility: CLINIC | Age: 72
End: 2022-12-27
Payer: COMMERCIAL

## 2022-12-27 VITALS
RESPIRATION RATE: 16 BRPM | HEART RATE: 73 BPM | SYSTOLIC BLOOD PRESSURE: 108 MMHG | OXYGEN SATURATION: 97 % | TEMPERATURE: 96.5 F | DIASTOLIC BLOOD PRESSURE: 74 MMHG

## 2022-12-27 VITALS
DIASTOLIC BLOOD PRESSURE: 75 MMHG | OXYGEN SATURATION: 98 % | RESPIRATION RATE: 15 BRPM | HEART RATE: 70 BPM | SYSTOLIC BLOOD PRESSURE: 122 MMHG | TEMPERATURE: 96.4 F

## 2022-12-27 PROCEDURE — 96413 CHEMO IV INFUSION 1 HR: CPT

## 2022-12-27 RX ORDER — ABATACEPT 250 MG/15ML
250 INJECTION, POWDER, LYOPHILIZED, FOR SOLUTION INTRAVENOUS
Qty: 0 | Refills: 0 | Status: COMPLETED | OUTPATIENT
Start: 2022-12-21

## 2022-12-27 RX ORDER — ACETAMINOPHEN 325 MG/1
325 TABLET ORAL
Qty: 0 | Refills: 0 | Status: COMPLETED | OUTPATIENT
Start: 2022-12-21

## 2022-12-27 NOTE — HISTORY OF PRESENT ILLNESS
[Denies] : Denies [No] : No [Yes] : Yes [Declined] : Declined [TB] : Tuberculosis screening [Hep acute panel] : Hepatitis acute panel [Left upper extremity] : Left upper extremity [24g] : 24g [Start Time: ___] : Medication Start Time: [unfilled] [End Time: ___] : Medication End Time: [unfilled] [IV discontinued. Intact. No signs or symptoms of IV complications noted. Time: ___] : IV discontinued. Intact. No signs or symptoms of IV complications noted. Time: [unfilled] [Patient  instructed to seek medical attention with signs and symptoms of adverse effects] : Patient  instructed to seek medical attention with signs and symptoms of adverse effects [Patient left unit in no acute distress] : Patient left unit in no acute distress [Medications administered as ordered and tolerated well.] : Medications administered as ordered and tolerated well. [de-identified] : 9:00AM [de-identified] : Patient defers Benadryl as pre medications. Next appointment is on 1/27/23 at 9:00AM

## 2023-01-31 ENCOUNTER — APPOINTMENT (OUTPATIENT)
Dept: RHEUMATOLOGY | Facility: CLINIC | Age: 73
End: 2023-01-31
Payer: COMMERCIAL

## 2023-01-31 VITALS
OXYGEN SATURATION: 95 % | SYSTOLIC BLOOD PRESSURE: 105 MMHG | DIASTOLIC BLOOD PRESSURE: 70 MMHG | HEART RATE: 66 BPM | RESPIRATION RATE: 18 BRPM | TEMPERATURE: 96.4 F

## 2023-01-31 VITALS
RESPIRATION RATE: 18 BRPM | OXYGEN SATURATION: 97 % | DIASTOLIC BLOOD PRESSURE: 74 MMHG | SYSTOLIC BLOOD PRESSURE: 117 MMHG | HEART RATE: 70 BPM | TEMPERATURE: 96.4 F

## 2023-01-31 PROCEDURE — 96413 CHEMO IV INFUSION 1 HR: CPT

## 2023-01-31 RX ORDER — ACETAMINOPHEN 325 MG/1
325 TABLET ORAL
Qty: 0 | Refills: 0 | Status: COMPLETED | OUTPATIENT
Start: 2023-01-18

## 2023-01-31 RX ORDER — ABATACEPT 250 MG/15ML
250 INJECTION, POWDER, LYOPHILIZED, FOR SOLUTION INTRAVENOUS
Qty: 0 | Refills: 0 | Status: COMPLETED | OUTPATIENT
Start: 2023-01-18

## 2023-02-02 NOTE — HISTORY OF PRESENT ILLNESS
[N/A] : N/A [Denies] : Denies [No] : No [Yes] : Yes [Informed consent documented in EHR.] : Informed consent documented in EHR. [TB] : Tuberculosis screening [Total Hep B core Ag] : total Hepatitis B Core antigen [Left upper extremity] : Left upper extremity [24g] : 24g [Medication Name: ___] : Medication Name: [unfilled] [Start Time: ___] : Medication Start Time: [unfilled] [End Time: ___] : Medication End Time: [unfilled] [IV discontinued. Intact. No signs or symptoms of IV complications noted. Time: ___] : IV discontinued. Intact. No signs or symptoms of IV complications noted. Time: [unfilled] [Patient  instructed to seek medical attention with signs and symptoms of adverse effects] : Patient  instructed to seek medical attention with signs and symptoms of adverse effects [Patient left unit in no acute distress] : Patient left unit in no acute distress [Medications administered as ordered and tolerated well.] : Medications administered as ordered and tolerated well. [de-identified] : Patient presents for Orencia infusion. Patient has tolerated past infusions well. Next appt made for 3/3/23 at 9AM. Patient aware and verbalized understanding.  [de-identified] : [id number]

## 2023-02-22 RX ORDER — DIPHENHYDRAMINE HCL 25 MG/1
25 TABLET ORAL
Qty: 1 | Refills: 0 | Status: DISCONTINUED | COMMUNITY
Start: 2022-07-22 | End: 2023-02-22

## 2023-03-02 ENCOUNTER — NON-APPOINTMENT (OUTPATIENT)
Age: 73
End: 2023-03-02

## 2023-03-03 ENCOUNTER — APPOINTMENT (OUTPATIENT)
Dept: RHEUMATOLOGY | Facility: CLINIC | Age: 73
End: 2023-03-03
Payer: COMMERCIAL

## 2023-03-03 VITALS
TEMPERATURE: 97.6 F | DIASTOLIC BLOOD PRESSURE: 69 MMHG | RESPIRATION RATE: 16 BRPM | HEART RATE: 70 BPM | SYSTOLIC BLOOD PRESSURE: 110 MMHG | OXYGEN SATURATION: 98 %

## 2023-03-03 PROCEDURE — 96413 CHEMO IV INFUSION 1 HR: CPT

## 2023-03-03 RX ORDER — ACETAMINOPHEN 325 MG/1
325 TABLET ORAL
Qty: 0 | Refills: 0 | Status: COMPLETED
Start: 2022-09-23

## 2023-03-03 RX ORDER — DIPHENHYDRAMINE HCL 25 MG/1
25 TABLET ORAL
Qty: 0 | Refills: 0 | Status: COMPLETED
Start: 2022-09-23

## 2023-03-03 RX ORDER — ABATACEPT 250 MG/15ML
250 INJECTION, POWDER, LYOPHILIZED, FOR SOLUTION INTRAVENOUS
Qty: 0 | Refills: 0 | Status: COMPLETED
Start: 2022-09-23

## 2023-03-03 NOTE — HISTORY OF PRESENT ILLNESS
[3] : 3 [Denies] : Denies [No] : No [Yes] : Yes [Declined] : Declined [Informed consent documented in EHR.] : Informed consent documented in EHR. [TB] : Tuberculosis screening [Hep acute panel] : Hepatitis acute panel [de-identified] : right hip / right lower back [Right upper extremity] : Right upper extremity [24g] : 24g [Start Time: ___] : Medication Start Time: [unfilled] [End Time: ___] : Medication End Time: [unfilled] [IV discontinued. Intact. No signs or symptoms of IV complications noted. Time: ___] : IV discontinued. Intact. No signs or symptoms of IV complications noted. Time: [unfilled] [Patient  instructed to seek medical attention with signs and symptoms of adverse effects] : Patient  instructed to seek medical attention with signs and symptoms of adverse effects [Patient left unit in no acute distress] : Patient left unit in no acute distress [Medications administered as ordered and tolerated well.] : Medications administered as ordered and tolerated well. [Blood drawn at time of visit] : Blood drawn at time of visit [Outside pharmacy] : Outside pharmacy [de-identified] : ac [de-identified] : [id number]

## 2023-03-06 ENCOUNTER — APPOINTMENT (OUTPATIENT)
Dept: RHEUMATOLOGY | Facility: CLINIC | Age: 73
End: 2023-03-06
Payer: COMMERCIAL

## 2023-03-06 VITALS
TEMPERATURE: 97.6 F | DIASTOLIC BLOOD PRESSURE: 68 MMHG | SYSTOLIC BLOOD PRESSURE: 116 MMHG | WEIGHT: 121.5 LBS | OXYGEN SATURATION: 97 % | BODY MASS INDEX: 24.54 KG/M2 | HEART RATE: 77 BPM

## 2023-03-06 DIAGNOSIS — M25.552 PAIN IN LEFT HIP: ICD-10-CM

## 2023-03-06 DIAGNOSIS — G56.23 LESION OF ULNAR NERVE, BILATERAL UPPER LIMBS: ICD-10-CM

## 2023-03-06 DIAGNOSIS — M70.62 TROCHANTERIC BURSITIS, LEFT HIP: ICD-10-CM

## 2023-03-06 LAB
25(OH)D3 SERPL-MCNC: 47.9 NG/ML
ALBUMIN SERPL ELPH-MCNC: 4.2 G/DL
ALP BLD-CCNC: 67 U/L
ALT SERPL-CCNC: 9 U/L
ANION GAP SERPL CALC-SCNC: 11 MMOL/L
AST SERPL-CCNC: 19 U/L
BASOPHILS # BLD AUTO: 0.04 K/UL
BASOPHILS NFR BLD AUTO: 0.5 %
BILIRUB SERPL-MCNC: 0.5 MG/DL
BUN SERPL-MCNC: 14 MG/DL
CALCIUM SERPL-MCNC: 9.8 MG/DL
CHLORIDE SERPL-SCNC: 108 MMOL/L
CO2 SERPL-SCNC: 25 MMOL/L
CREAT SERPL-MCNC: 0.72 MG/DL
CRP SERPL-MCNC: <3 MG/L
EGFR: 90 ML/MIN/1.73M2
EOSINOPHIL # BLD AUTO: 0.06 K/UL
EOSINOPHIL NFR BLD AUTO: 0.8 %
ERYTHROCYTE [SEDIMENTATION RATE] IN BLOOD BY WESTERGREN METHOD: 16 MM/HR
GLUCOSE SERPL-MCNC: 80 MG/DL
HCT VFR BLD CALC: 41 %
HGB BLD-MCNC: 12.8 G/DL
IMM GRANULOCYTES NFR BLD AUTO: 0.5 %
LYMPHOCYTES # BLD AUTO: 2.03 K/UL
LYMPHOCYTES NFR BLD AUTO: 27 %
MAN DIFF?: NORMAL
MCHC RBC-ENTMCNC: 30.3 PG
MCHC RBC-ENTMCNC: 31.2 GM/DL
MCV RBC AUTO: 97.2 FL
MONOCYTES # BLD AUTO: 0.63 K/UL
MONOCYTES NFR BLD AUTO: 8.4 %
NEUTROPHILS # BLD AUTO: 4.71 K/UL
NEUTROPHILS NFR BLD AUTO: 62.8 %
PLATELET # BLD AUTO: 262 K/UL
POTASSIUM SERPL-SCNC: 5.1 MMOL/L
PROT SERPL-MCNC: 6.2 G/DL
RBC # BLD: 4.22 M/UL
RBC # FLD: 14.4 %
SODIUM SERPL-SCNC: 143 MMOL/L
WBC # FLD AUTO: 7.51 K/UL

## 2023-03-06 PROCEDURE — 20610 DRAIN/INJ JOINT/BURSA W/O US: CPT | Mod: LT

## 2023-03-06 PROCEDURE — 99214 OFFICE O/P EST MOD 30 MIN: CPT | Mod: 25

## 2023-03-28 ENCOUNTER — APPOINTMENT (OUTPATIENT)
Dept: RHEUMATOLOGY | Facility: CLINIC | Age: 73
End: 2023-03-28
Payer: COMMERCIAL

## 2023-03-28 VITALS
OXYGEN SATURATION: 98 % | RESPIRATION RATE: 16 BRPM | HEIGHT: 59 IN | DIASTOLIC BLOOD PRESSURE: 78 MMHG | TEMPERATURE: 96.9 F | SYSTOLIC BLOOD PRESSURE: 119 MMHG | HEART RATE: 61 BPM | WEIGHT: 121 LBS | BODY MASS INDEX: 24.39 KG/M2
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
## 2023-03-28 PROCEDURE — 99214 OFFICE O/P EST MOD 30 MIN: CPT
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
## 2023-03-29 RX ORDER — METHYLPRED ACET/NACL,ISO-OS/PF 80 MG/ML
80 VIAL (ML) INJECTION
Qty: 1 | Refills: 0 | Status: COMPLETED | OUTPATIENT
Start: 2023-03-29

## 2023-03-29 RX ADMIN — METHYLPREDNISOLONE ACETATE MG/ML: 80 INJECTION, SUSPENSION INTRA-ARTICULAR; INTRALESIONAL; INTRAMUSCULAR; SOFT TISSUE at 00:00

## 2023-03-29 NOTE — PHYSICAL EXAM
[General Appearance - Alert] : alert [General Appearance - In No Acute Distress] : in no acute distress [General Appearance - Well Nourished] : well nourished [Sclera] : the sclera and conjunctiva were normal [PERRL With Normal Accommodation] : pupils were equal in size, round, and reactive to light [Extraocular Movements] : extraocular movements were intact [Outer Ear] : the ears and nose were normal in appearance [Oropharynx] : the oropharynx was normal [Neck Appearance] : the appearance of the neck was normal [Auscultation Breath Sounds / Voice Sounds] : lungs were clear to auscultation bilaterally [Heart Rate And Rhythm] : heart rate was normal and rhythm regular [Heart Sounds] : normal S1 and S2 [Murmurs] : no murmurs [Edema] : there was no peripheral edema [No CVA Tenderness] : no ~M costovertebral angle tenderness [No Spinal Tenderness] : no spinal tenderness [Abnormal Walk] : normal gait [Nail Clubbing] : no clubbing  or cyanosis of the fingernails [Motor Tone] : muscle strength and tone were normal [] : no rash [No Focal Deficits] : no focal deficits [Oriented To Time, Place, And Person] : oriented to person, place, and time [Impaired Insight] : insight and judgment were intact [Affect] : the affect was normal [FreeTextEntry1] : Strength in hands 5/5

## 2023-03-29 NOTE — REVIEW OF SYSTEMS
[Arthralgias] : arthralgias [Joint Pain] : joint pain [Negative] : Heme/Lymph [Joint Swelling] : no joint swelling [Joint Stiffness] : joint stiffness [As Noted in HPI] : as noted in HPI

## 2023-03-29 NOTE — PHYSICAL EXAM
[General Appearance - Alert] : alert [General Appearance - In No Acute Distress] : in no acute distress [General Appearance - Well Nourished] : well nourished [Sclera] : the sclera and conjunctiva were normal [PERRL With Normal Accommodation] : pupils were equal in size, round, and reactive to light [Extraocular Movements] : extraocular movements were intact [Outer Ear] : the ears and nose were normal in appearance [Nasal Cavity] : the nasal mucosa and septum were normal [Oropharynx] : the oropharynx was normal [Neck Appearance] : the appearance of the neck was normal [Auscultation Breath Sounds / Voice Sounds] : lungs were clear to auscultation bilaterally [Heart Rate And Rhythm] : heart rate was normal and rhythm regular [Heart Sounds] : normal S1 and S2 [Murmurs] : no murmurs [No CVA Tenderness] : no ~M costovertebral angle tenderness [No Spinal Tenderness] : no spinal tenderness [Abnormal Walk] : normal gait [Nail Clubbing] : no clubbing  or cyanosis of the fingernails [Motor Tone] : muscle strength and tone were normal [] : no rash [FreeTextEntry1] : ecchymoses 2/2 recent fall  [Motor Exam] : the motor exam was normal [No Focal Deficits] : no focal deficits [Oriented To Time, Place, And Person] : oriented to person, place, and time [Impaired Insight] : insight and judgment were intact [Affect] : the affect was normal

## 2023-03-29 NOTE — PROCEDURE
[Other Date:___] : Date: [unfilled] [FreeTextEntry1] : Procedure: L GTB injection \par \par Verbal consent obtained from KRISHNA DÍAZ after discussion of risks/ benefits / alternatives which include but are not limited to pain at injection site, infection, bleeding, skin hypopigmentation. \par \par Site identified, marked and sterilized with Betadine. Ethyl chloride applied for topical anesthetic.\par \par 25g needle inserted into point of maximal tenderness. 80mg of depomedrol and 2 mL of lidocaine were injected, fanning the medication into the local area. \par \par Ms. DÍAZ tolerated procedure well and there was minimal blood loss.\par \par Post-procedure instructions provided to Ms. DÍAZ including to avoid strenuous exercise for the next 48 hours and apply cold compresses to joint q6 hours for next 24 hours. Advised to notify the office and be evaluated at ED/ UC if worsening pain, swelling, warmth, or bleeding from site or if he develops a fever, chills. Pt verbalized understanding.

## 2023-03-29 NOTE — REVIEW OF SYSTEMS
[Arthralgias] : arthralgias [Joint Pain] : joint pain [Joint Swelling] : no joint swelling [Joint Stiffness] : joint stiffness [As Noted in HPI] : as noted in HPI [Negative] : Heme/Lymph

## 2023-03-29 NOTE — HISTORY OF PRESENT ILLNESS
[FreeTextEntry1] : KRISHNA DÍAZ is a 72 year old woman who presents with prior dx of RA since age 27, presently on Medrol 2mg/day (chronically on this dose, has not been able to taper lower) and Orencia infusions x 10 years at dose of 1000mg, last dose 3 months as reports insurance has not approved the renewal of the PA. Reports poor response to SQ orencia and lower doses of IV orencia in the past. Denies any SE, no infectious sx with this dose. Denies any severe flares in thel last few years but does report she feels worsening arthralgias in the week just prior to infusions.  Currently reports all day stiffness and some arthralgias in R 3rd PIP, R shoulder, b/l wrists, b/l elbows, b/l knees. R 3rd PIP is more swollen than normal.  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 No personal fractures, no parental fx, no tobacco/ no ETOH / + chronic steroids/ + RA. \par Steady on feet, walking for exercise, dietary Ca, daily Vit D 2K. \par \par + L hip labral tear, some chronic MSK related pain, no limitation to ambulation \par + ocular migraines and some double vision in b/l eyes intermittently x 1 year -- never had inflammatory eye disease \par + ?"scarring on lung from RA" but never changed meds for this, no current pulmonary sx \par + b/l tinnitus \par \par CT T spine - no overt abnormality, no fx \par \par ---------\par 1/18/22 -- Since last visit some loss of hand  strength but moreso 2/2 thumb CMC pain. No active synovitis, effusions, prolonged stiffness, no extra-articular inflammatory sx. No falls, exercising with walking more frequently. Feels well otherwise. \par \par 4/22/22 -- Some finger pain and stiffness, no synovitis, some increased fatigue but able to do all ADLs. Had a traumatic fall 2 weeks ago, no fractures, diffuse myalgias are improving. It did worsen her OA related hip pain but she is able to ambulate and tylenol is helping. No other falls, steady on her feet usually, no plans for upcoming dental work. \par \par 7/22/22 -- Fluctuating R sided mid back pain with radiation to front, when very severe Tylenol and muscle relaxer does not help. Some worsening b/l hip and GTB pain as well as b/l 3rd PIP with milder generalized b/l hand all day achy pain. R shoulder pain with rotating behind her back only. \par \par 3/6/23 -- L hip GTB worsening pain, making ambulation hard at times, mid back pain and spasm, XR without fracture. Achy pain in hands and wrists and more AM stiffness, no carrington synovitis, some numbness b/l in ulnar distribution too but no focal pain over elbows, no extra-articular inflammatory sx. Just had infusion 3 days ago. No infectious sx. \par \par 3/29/23 -- Tripped over rug last week, bruising to L knee, small laceration to L elbow and some trauma to nose - no fractures to limbs or face on XRs/CT. Currently on medrol 4mg which has helped partially with hand and elbow sx but has not fully resolved it despite infusion as well, getting cushingoid facies and some LE edema since being on higher dose medrol. No extra-articular inflammatory sx, no infectious sx. GTB injection was partially helpful, hip feeling better at present.

## 2023-03-29 NOTE — ASSESSMENT
[FreeTextEntry1] : KRISHNA DÍAZ is a 73 year old woman with chronic seronegative, reportedly nonerosive RA who has been maintained for the last 10 years on IV high dose Orencia (reportedly not responsive to lower doses) and low dose Medrol 2mg. Currently off Orencia, no active sx today, prior synovitis appears resolved. \par \par # seronegative RA, nonerosive on imaging recently, worsening symmetrical joint pain since last visit raising suspicion of activity, improved with increased dose of steroids but not fully resolved and now with steroid SE \par - stay on medrol 4mg/day to complete 2 weeks, then taper back to 2mg/day \par - Will proceed with upcoming Orencia at current dose but attempt to increase to 750mg/dose for May dose to see if helps. \par - compression stockings for LE edema as well as elevating feet when seated \par \par # steroid induced OP, tolerating Prolia well, recent mechanical fall \par - imaging negative for fx \par - next prolia due May \par - c/w dietary Ca goal 600mg BID with food, Vit D 2000 IU daily. \par - C/w weight bearing exercise for 30min 3-4x/week to maintain BMD \par - repeat DEXA 6/2023 -- has order \par - advised to review house for fall risk and ensure clear floors \par \par # L hip pain s/p prior labral tear, GTB pain improved with injection, ongoing thoracic spasm \par - if worsening hip pain will call me for further imaging or possible injection \par - c/w tylenol and ROM exercises \par - c/w use of heating pad and massage for spasm \par \par RTC in 2 months

## 2023-03-29 NOTE — HISTORY OF PRESENT ILLNESS
[FreeTextEntry1] : KRISHNA DÍAZ is a 72 year old woman who presents with prior dx of RA since age 27, presently on Medrol 2mg/day (chronically on this dose, has not been able to taper lower) and Orencia infusions x 10 years at dose of 1000mg, last dose 3 months as reports insurance has not approved the renewal of the PA. Reports poor response to SQ orencia and lower doses of IV orencia in the past. Denies any SE, no infectious sx with this dose. Denies any severe flares in thel last few years but does report she feels worsening arthralgias in the week just prior to infusions.  Currently reports all day stiffness and some arthralgias in R 3rd PIP, R shoulder, b/l wrists, b/l elbows, b/l knees. R 3rd PIP is more swollen than normal.  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 No personal fractures, no parental fx, no tobacco/ no ETOH / + chronic steroids/ + RA. \par Steady on feet, walking for exercise, dietary Ca, daily Vit D 2K. \par \par + L hip labral tear, some chronic MSK related pain, no limitation to ambulation \par + ocular migraines and some double vision in b/l eyes intermittently x 1 year -- never had inflammatory eye disease \par + ?"scarring on lung from RA" but never changed meds for this, no current pulmonary sx \par + b/l tinnitus \par \par CT T spine - no overt abnormality, no fx \par \par ---------\par 1/18/22 -- Since last visit some loss of hand  strength but moreso 2/2 thumb CMC pain. No active synovitis, effusions, prolonged stiffness, no extra-articular inflammatory sx. No falls, exercising with walking more frequently. Feels well otherwise. \par \par 4/22/22 -- Some finger pain and stiffness, no synovitis, some increased fatigue but able to do all ADLs. Had a traumatic fall 2 weeks ago, no fractures, diffuse myalgias are improving. It did worsen her OA related hip pain but she is able to ambulate and tylenol is helping. No other falls, steady on her feet usually, no plans for upcoming dental work. \par \par 7/22/22 -- Fluctuating R sided mid back pain with radiation to front, when very severe Tylenol and muscle relaxer does not help. Some worsening b/l hip and GTB pain as well as b/l 3rd PIP with milder generalized b/l hand all day achy pain. R shoulder pain with rotating behind her back only. \par \par 3/6/23 -- L hip GTB worsening pain, making ambulation hard at times, mid back pain and spasm, XR without fracture. Achy pain in hands and wrists and more AM stiffness, no carrington synovitis, some numbness b/l in ulnar distribution too but no focal pain over elbows, no extra-articular inflammatory sx. Just had infusion 3 days ago. No infectious sx.

## 2023-03-29 NOTE — ASSESSMENT
[FreeTextEntry1] : KRISHNA DÍAZ is a 73 year old woman with chronic seronegative, reportedly nonerosive RA who has been maintained for the last 10 years on IV high dose Orencia (reportedly not responsive to lower doses) and low dose Medrol 2mg. Currently off Orencia, no active sx today, prior synovitis appears resolved. \par \par # seronegative RA, nonerosive on imaging recently, worsening symmetrical joint pain since last visit raising suspicion of activity, ongoing despite infusion 3 days ago \par - Increase medrol to 8mg daily and taper back down to 4mg slowly until next visit and will see if resolves sx \par - if not may need increased dose of infusion as formerly was on 1000mg with prior rheum \par - reviewed last labs with her \par \par # steroid induced OP, tolerating Prolia well \par - imaging negative for fx \par - next prolia due May \par - c/w dietary Ca goal 600mg BID with food, Vit D 2000 IU daily. \par - C/w weight bearing exercise for 30min 3-4x/week to maintain BMD \par - repeat DEXA 6/2023 -- ordered today \par \par # L hip pain s/p prior labral tear, now with worsening GTB TTP too, ongoing thoracic spasm \par - s/p GTB injection, post procedure instructions provided \par - if above not effective, will likely need US guided hip injection and/or PT \par - c/w tylenol and ROM exercises \par - c/w use of heating pad and massage for spasm \par \par RTC in 3 weeks

## 2023-03-31 ENCOUNTER — APPOINTMENT (OUTPATIENT)
Dept: RHEUMATOLOGY | Facility: CLINIC | Age: 73
End: 2023-03-31
Payer: COMMERCIAL

## 2023-03-31 VITALS
RESPIRATION RATE: 18 BRPM | SYSTOLIC BLOOD PRESSURE: 107 MMHG | DIASTOLIC BLOOD PRESSURE: 72 MMHG | TEMPERATURE: 97.5 F | HEART RATE: 73 BPM | OXYGEN SATURATION: 98 %

## 2023-03-31 VITALS
OXYGEN SATURATION: 97 % | DIASTOLIC BLOOD PRESSURE: 64 MMHG | RESPIRATION RATE: 18 BRPM | HEART RATE: 68 BPM | SYSTOLIC BLOOD PRESSURE: 92 MMHG

## 2023-03-31 PROCEDURE — 96413 CHEMO IV INFUSION 1 HR: CPT

## 2023-03-31 RX ORDER — ACETAMINOPHEN 325 MG/1
325 TABLET ORAL
Qty: 0 | Refills: 0 | Status: COMPLETED
Start: 2022-09-23

## 2023-03-31 RX ORDER — ABATACEPT 250 MG/15ML
250 INJECTION, POWDER, LYOPHILIZED, FOR SOLUTION INTRAVENOUS
Qty: 0 | Refills: 0 | Status: COMPLETED
Start: 2022-09-23

## 2023-03-31 NOTE — HISTORY OF PRESENT ILLNESS
[Denies] : Denies [Yes] : Yes [Declined] : Declined [TB] : Tuberculosis screening [Hep acute panel] : Hepatitis acute panel [Right upper extremity] : Right upper extremity [22g] : 22g [Start Time: ___] : Medication Start Time: [unfilled] [End Time: ___] : Medication End Time: [unfilled] [IV discontinued. Intact. No signs or symptoms of IV complications noted. Time: ___] : IV discontinued. Intact. No signs or symptoms of IV complications noted. Time: [unfilled] [Patient  instructed to seek medical attention with signs and symptoms of adverse effects] : Patient  instructed to seek medical attention with signs and symptoms of adverse effects [Patient left unit in no acute distress] : Patient left unit in no acute distress [Medications administered as ordered and tolerated well.] : Medications administered as ordered and tolerated well. [de-identified] : Generalized joint pain reported.  [de-identified] : Patient reports recent fall at home.  [de-identified] : 09:10 am

## 2023-05-02 RX ORDER — ABATACEPT 250 MG/15ML
250 INJECTION, POWDER, LYOPHILIZED, FOR SOLUTION INTRAVENOUS
Qty: 0 | Refills: 0 | Status: COMPLETED | OUTPATIENT
Start: 2022-09-23 | End: 2023-05-02

## 2023-05-02 RX ORDER — DENOSUMAB 60 MG/ML
60 INJECTION SUBCUTANEOUS
Qty: 1 | Refills: 0 | Status: COMPLETED | OUTPATIENT
Start: 2023-05-02 | End: 1900-01-01

## 2023-05-05 ENCOUNTER — APPOINTMENT (OUTPATIENT)
Dept: RHEUMATOLOGY | Facility: CLINIC | Age: 73
End: 2023-05-05
Payer: COMMERCIAL

## 2023-05-05 PROCEDURE — ZZZZZ: CPT

## 2023-05-05 PROCEDURE — 96413 CHEMO IV INFUSION 1 HR: CPT

## 2023-05-05 PROCEDURE — 96401 CHEMO ANTI-NEOPL SQ/IM: CPT | Mod: 59

## 2023-05-05 RX ORDER — DIPHENHYDRAMINE HCL 25 MG/1
25 TABLET ORAL
Qty: 0 | Refills: 0 | Status: COMPLETED
Start: 2022-09-23

## 2023-05-05 RX ORDER — ABATACEPT 250 MG/15ML
250 INJECTION, POWDER, LYOPHILIZED, FOR SOLUTION INTRAVENOUS
Qty: 0 | Refills: 0 | Status: COMPLETED
Start: 2023-04-17

## 2023-05-05 RX ORDER — DENOSUMAB 60 MG/ML
60 INJECTION SUBCUTANEOUS
Qty: 1 | Refills: 0 | Status: COMPLETED
Start: 2023-05-02

## 2023-05-05 RX ORDER — ACETAMINOPHEN 325 MG/1
325 TABLET ORAL
Qty: 0 | Refills: 0 | Status: COMPLETED
Start: 2022-09-23

## 2023-05-05 NOTE — HISTORY OF PRESENT ILLNESS
[N/A] : N/A [Denies] : Denies [No] : No [Yes] : Yes [Declined] : Declined [Informed consent documented in EHR.] : Informed consent documented in EHR. [TB] : Tuberculosis screening [Hep acute panel] : Hepatitis acute panel [Right upper extremity] : Right upper extremity [22g] : 22g [Start Time: ___] : Medication Start Time: [unfilled] [End Time: ___] : Medication End Time: [unfilled] [IV discontinued. Intact. No signs or symptoms of IV complications noted. Time: ___] : IV discontinued. Intact. No signs or symptoms of IV complications noted. Time: [unfilled] [Patient  instructed to seek medical attention with signs and symptoms of adverse effects] : Patient  instructed to seek medical attention with signs and symptoms of adverse effects [Patient left unit in no acute distress] : Patient left unit in no acute distress [Medications administered as ordered and tolerated well.] : Medications administered as ordered and tolerated well. [de-identified] : ac [de-identified] : [id number]

## 2023-05-23 ENCOUNTER — APPOINTMENT (OUTPATIENT)
Dept: RHEUMATOLOGY | Facility: CLINIC | Age: 73
End: 2023-05-23
Payer: COMMERCIAL

## 2023-05-23 VITALS
SYSTOLIC BLOOD PRESSURE: 105 MMHG | WEIGHT: 123 LBS | OXYGEN SATURATION: 98 % | TEMPERATURE: 96.5 F | HEART RATE: 70 BPM | BODY MASS INDEX: 24.8 KG/M2 | RESPIRATION RATE: 16 BRPM | DIASTOLIC BLOOD PRESSURE: 75 MMHG | HEIGHT: 59 IN

## 2023-05-23 PROCEDURE — 99214 OFFICE O/P EST MOD 30 MIN: CPT

## 2023-05-24 ENCOUNTER — NON-APPOINTMENT (OUTPATIENT)
Age: 73
End: 2023-05-24

## 2023-05-24 NOTE — PHYSICAL EXAM
[General Appearance - Alert] : alert [General Appearance - In No Acute Distress] : in no acute distress [General Appearance - Well Nourished] : well nourished [Sclera] : the sclera and conjunctiva were normal [PERRL With Normal Accommodation] : pupils were equal in size, round, and reactive to light [Extraocular Movements] : extraocular movements were intact [Outer Ear] : the ears and nose were normal in appearance [Nasal Cavity] : the nasal mucosa and septum were normal [Oropharynx] : the oropharynx was normal [Neck Appearance] : the appearance of the neck was normal [Auscultation Breath Sounds / Voice Sounds] : lungs were clear to auscultation bilaterally [Heart Rate And Rhythm] : heart rate was normal and rhythm regular [Heart Sounds] : normal S1 and S2 [Murmurs] : no murmurs [No CVA Tenderness] : no ~M costovertebral angle tenderness [No Spinal Tenderness] : no spinal tenderness [Abnormal Walk] : normal gait [Nail Clubbing] : no clubbing  or cyanosis of the fingernails [Motor Tone] : muscle strength and tone were normal [] : no rash [Motor Exam] : the motor exam was normal [No Focal Deficits] : no focal deficits [Oriented To Time, Place, And Person] : oriented to person, place, and time [Impaired Insight] : insight and judgment were intact [Affect] : the affect was normal [FreeTextEntry1] : No synovitis but TTP over b/l MCPs/PIPs and wrists, less TTP over ulnar groove in elbows b/l. OA changes in knees, no effusion. Some mild chronic RA deformities

## 2023-05-24 NOTE — ASSESSMENT
[FreeTextEntry1] : KRISHNA DÍAZ is a 73 year old woman with chronic seronegative, reportedly nonerosive RA who has been maintained for the last 10 years on IV high dose Orencia (reportedly not responsive to lower doses) and low dose Medrol 2mg. Currently off Orencia, no active sx today, prior synovitis appears resolved. \par \par # seronegative RA, nonerosive on imaging recently, worsening symmetrical joint pain since last visit raising suspicion of activity, improved with increased dose of steroids but not fully resolved and now with steroid SE, has been approved for higher Orencia dose\par - will assess response to Orencia 750mg dose\par - will attempt to taper off Medrol - taper provided - may help with cushingoid facies but cautioned unclear how much will help with LE edema as appear moreso dependant. Aware she may have worsening arthralgias when tapers, if ADLs intact favor just Tylenol use but will let me know if carrington synovitis recurs \par - c/w compression stockings and leg elevation, if worsening, f/u cardiology \par \par # steroid induced OP, tolerating Prolia well, no further falls \par - imaging negative for fx \par - has MIN appt for next prolia \par - c/w dietary Ca goal 600mg BID with food, Vit D 2000 IU daily. \par - C/w weight bearing exercise for 30min 3-4x/week to maintain BMD \par - repeat DEXA 6/2023 -- has order \par \par # L hip pain s/p prior labral tear, GTB pain improved with injection, ongoing thoracic spasm -- less active \par - c/w tylenol and ROM exercises \par - c/w use of heating pad and massage for spasm \par \par RTC in 2 months

## 2023-05-24 NOTE — REVIEW OF SYSTEMS
X Size Of Lesion In Cm: 0 [Arthralgias] : arthralgias [Joint Pain] : joint pain [Negative] : Heme/Lymph [As Noted in HPI] : as noted in HPI [Lower Ext Edema] : lower extremity edema [Joint Swelling] : no joint swelling [Joint Stiffness] : no joint stiffness

## 2023-05-24 NOTE — HISTORY OF PRESENT ILLNESS
[FreeTextEntry1] : KRISHNA DÍAZ is a 72 year old woman who presents with prior dx of RA since age 27, presently on Medrol 2mg/day (chronically on this dose, has not been able to taper lower) and Orencia infusions x 10 years at dose of 1000mg, last dose 3 months as reports insurance has not approved the renewal of the PA. Reports poor response to SQ orencia and lower doses of IV orencia in the past. Denies any SE, no infectious sx with this dose. Denies any severe flares in thel last few years but does report she feels worsening arthralgias in the week just prior to infusions.  Currently reports all day stiffness and some arthralgias in R 3rd PIP, R shoulder, b/l wrists, b/l elbows, b/l knees. R 3rd PIP is more swollen than normal.  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 No personal fractures, no parental fx, no tobacco/ no ETOH / + chronic steroids/ + RA. \par Steady on feet, walking for exercise, dietary Ca, daily Vit D 2K. \par \par + L hip labral tear, some chronic MSK related pain, no limitation to ambulation \par + ocular migraines and some double vision in b/l eyes intermittently x 1 year -- never had inflammatory eye disease \par + ?"scarring on lung from RA" but never changed meds for this, no current pulmonary sx \par + b/l tinnitus \par \par CT T spine - no overt abnormality, no fx \par \par ---------\par 1/18/22 -- Since last visit some loss of hand  strength but moreso 2/2 thumb CMC pain. No active synovitis, effusions, prolonged stiffness, no extra-articular inflammatory sx. No falls, exercising with walking more frequently. Feels well otherwise. \par \par 4/22/22 -- Some finger pain and stiffness, no synovitis, some increased fatigue but able to do all ADLs. Had a traumatic fall 2 weeks ago, no fractures, diffuse myalgias are improving. It did worsen her OA related hip pain but she is able to ambulate and tylenol is helping. No other falls, steady on her feet usually, no plans for upcoming dental work. \par \par 7/22/22 -- Fluctuating R sided mid back pain with radiation to front, when very severe Tylenol and muscle relaxer does not help. Some worsening b/l hip and GTB pain as well as b/l 3rd PIP with milder generalized b/l hand all day achy pain. R shoulder pain with rotating behind her back only. \par \par 3/6/23 -- L hip GTB worsening pain, making ambulation hard at times, mid back pain and spasm, XR without fracture. Achy pain in hands and wrists and more AM stiffness, no carrington synovitis, some numbness b/l in ulnar distribution too but no focal pain over elbows, no extra-articular inflammatory sx. Just had infusion 3 days ago. No infectious sx. \par \par 3/29/23 -- Tripped over rug last week, bruising to L knee, small laceration to L elbow and some trauma to nose - no fractures to limbs or face on XRs/CT. Currently on medrol 4mg which has helped partially with hand and elbow sx but has not fully resolved it despite infusion as well, getting cushingoid facies and some LE edema since being on higher dose medrol. No extra-articular inflammatory sx, no infectious sx. GTB injection was partially helpful, hip feeling better at present. \par \par 5/23/23 -- Stable from RA standpoint, no active joints or no extra-articular inflammatory sx, no infectious sx. Worsening cardiac murmur and edema, but cardiac w/u negative, advised to see if can get off steroids, edema improved with compression stockings. No further falls.

## 2023-06-08 ENCOUNTER — EMERGENCY (EMERGENCY)
Facility: HOSPITAL | Age: 73
LOS: 1 days | Discharge: HOME | End: 2023-06-08
Attending: EMERGENCY MEDICINE | Admitting: EMERGENCY MEDICINE
Payer: COMMERCIAL

## 2023-06-08 VITALS
HEIGHT: 59 IN | TEMPERATURE: 98 F | SYSTOLIC BLOOD PRESSURE: 94 MMHG | OXYGEN SATURATION: 97 % | RESPIRATION RATE: 18 BRPM | HEART RATE: 101 BPM | DIASTOLIC BLOOD PRESSURE: 67 MMHG | WEIGHT: 114.64 LBS

## 2023-06-08 LAB
ALBUMIN SERPL ELPH-MCNC: 3.2 G/DL — LOW (ref 3.3–5)
ALP SERPL-CCNC: 62 U/L — SIGNIFICANT CHANGE UP (ref 40–120)
ALT FLD-CCNC: 19 U/L — SIGNIFICANT CHANGE UP (ref 12–78)
ANION GAP SERPL CALC-SCNC: 10 MMOL/L — SIGNIFICANT CHANGE UP (ref 5–17)
AST SERPL-CCNC: 40 U/L — HIGH (ref 15–37)
BASOPHILS # BLD AUTO: 0.03 K/UL — SIGNIFICANT CHANGE UP (ref 0–0.2)
BASOPHILS NFR BLD AUTO: 0.3 % — SIGNIFICANT CHANGE UP (ref 0–2)
BILIRUB SERPL-MCNC: 0.4 MG/DL — SIGNIFICANT CHANGE UP (ref 0.2–1.2)
BUN SERPL-MCNC: 15 MG/DL — SIGNIFICANT CHANGE UP (ref 7–23)
CALCIUM SERPL-MCNC: 8.8 MG/DL — SIGNIFICANT CHANGE UP (ref 8.5–10.1)
CHLORIDE SERPL-SCNC: 105 MMOL/L — SIGNIFICANT CHANGE UP (ref 96–108)
CO2 SERPL-SCNC: 21 MMOL/L — LOW (ref 22–31)
CREAT SERPL-MCNC: 0.84 MG/DL — SIGNIFICANT CHANGE UP (ref 0.5–1.3)
EGFR: 74 ML/MIN/1.73M2 — SIGNIFICANT CHANGE UP
EOSINOPHIL # BLD AUTO: 0.01 K/UL — SIGNIFICANT CHANGE UP (ref 0–0.5)
EOSINOPHIL NFR BLD AUTO: 0.1 % — SIGNIFICANT CHANGE UP (ref 0–6)
GLUCOSE SERPL-MCNC: 145 MG/DL — HIGH (ref 70–99)
HCT VFR BLD CALC: 42.8 % — SIGNIFICANT CHANGE UP (ref 34.5–45)
HGB BLD-MCNC: 14.4 G/DL — SIGNIFICANT CHANGE UP (ref 11.5–15.5)
IMM GRANULOCYTES NFR BLD AUTO: 1.4 % — HIGH (ref 0–0.9)
LYMPHOCYTES # BLD AUTO: 2.76 K/UL — SIGNIFICANT CHANGE UP (ref 1–3.3)
LYMPHOCYTES # BLD AUTO: 31.2 % — SIGNIFICANT CHANGE UP (ref 13–44)
MCHC RBC-ENTMCNC: 30.7 PG — SIGNIFICANT CHANGE UP (ref 27–34)
MCHC RBC-ENTMCNC: 33.6 GM/DL — SIGNIFICANT CHANGE UP (ref 32–36)
MCV RBC AUTO: 91.3 FL — SIGNIFICANT CHANGE UP (ref 80–100)
MONOCYTES # BLD AUTO: 1.02 K/UL — HIGH (ref 0–0.9)
MONOCYTES NFR BLD AUTO: 11.5 % — SIGNIFICANT CHANGE UP (ref 2–14)
NEUTROPHILS # BLD AUTO: 4.92 K/UL — SIGNIFICANT CHANGE UP (ref 1.8–7.4)
NEUTROPHILS NFR BLD AUTO: 55.5 % — SIGNIFICANT CHANGE UP (ref 43–77)
NRBC # BLD: 0 /100 WBCS — SIGNIFICANT CHANGE UP (ref 0–0)
PLATELET # BLD AUTO: 408 K/UL — HIGH (ref 150–400)
POTASSIUM SERPL-MCNC: 3.5 MMOL/L — SIGNIFICANT CHANGE UP (ref 3.5–5.3)
POTASSIUM SERPL-SCNC: 3.5 MMOL/L — SIGNIFICANT CHANGE UP (ref 3.5–5.3)
PROT SERPL-MCNC: 6.8 G/DL — SIGNIFICANT CHANGE UP (ref 6–8.3)
RBC # BLD: 4.69 M/UL — SIGNIFICANT CHANGE UP (ref 3.8–5.2)
RBC # FLD: 12.9 % — SIGNIFICANT CHANGE UP (ref 10.3–14.5)
SODIUM SERPL-SCNC: 136 MMOL/L — SIGNIFICANT CHANGE UP (ref 135–145)
WBC # BLD: 8.86 K/UL — SIGNIFICANT CHANGE UP (ref 3.8–10.5)
WBC # FLD AUTO: 8.86 K/UL — SIGNIFICANT CHANGE UP (ref 3.8–10.5)

## 2023-06-08 PROCEDURE — 80053 COMPREHEN METABOLIC PANEL: CPT

## 2023-06-08 PROCEDURE — 36415 COLL VENOUS BLD VENIPUNCTURE: CPT

## 2023-06-08 PROCEDURE — 71045 X-RAY EXAM CHEST 1 VIEW: CPT | Mod: 26

## 2023-06-08 PROCEDURE — 96374 THER/PROPH/DIAG INJ IV PUSH: CPT

## 2023-06-08 PROCEDURE — 71045 X-RAY EXAM CHEST 1 VIEW: CPT

## 2023-06-08 PROCEDURE — 85025 COMPLETE CBC W/AUTO DIFF WBC: CPT

## 2023-06-08 PROCEDURE — 99284 EMERGENCY DEPT VISIT MOD MDM: CPT

## 2023-06-08 PROCEDURE — 99284 EMERGENCY DEPT VISIT MOD MDM: CPT | Mod: 25

## 2023-06-08 RX ORDER — SODIUM CHLORIDE 9 MG/ML
1000 INJECTION INTRAMUSCULAR; INTRAVENOUS; SUBCUTANEOUS
Refills: 0 | Status: DISCONTINUED | OUTPATIENT
Start: 2023-06-08 | End: 2023-06-12

## 2023-06-08 RX ORDER — ONDANSETRON 8 MG/1
4 TABLET, FILM COATED ORAL ONCE
Refills: 0 | Status: COMPLETED | OUTPATIENT
Start: 2023-06-08 | End: 2023-06-08

## 2023-06-08 RX ORDER — ONDANSETRON 8 MG/1
1 TABLET, FILM COATED ORAL
Qty: 10 | Refills: 0
Start: 2023-06-08

## 2023-06-08 RX ADMIN — SODIUM CHLORIDE 2000 MILLILITER(S): 9 INJECTION INTRAMUSCULAR; INTRAVENOUS; SUBCUTANEOUS at 20:43

## 2023-06-08 RX ADMIN — ONDANSETRON 4 MILLIGRAM(S): 8 TABLET, FILM COATED ORAL at 20:42

## 2023-06-08 NOTE — ED PROVIDER NOTE - DIFFERENTIAL DIAGNOSIS
Differential Diagnosis Rule out acute electrode abnormality, COVID-pneumonia, dehydration, other acute pathology

## 2023-06-08 NOTE — ED PROVIDER NOTE - OBJECTIVE STATEMENT
73-year-old female with a history of hypertrophic cardiomyopathy, rheumatoid arthritis, hypothyroid presents with has had cough/URI over the past 5 days.  Patient was diagnosed with COVID.  Patient has been on Paxlovid for the past 4 days.  No acute chest pain or shortness of breath.  Patient has been having waxing waning nausea, decreased p.o. intake.  Patient feeling lightheaded today, no syncope.  No numbness/tingling or focal weakness.  No acute abdominal pain.  No lower extremity edema.  No aggravating or alleviating factors otherwise noted.  No other acute injury or complaints.

## 2023-06-08 NOTE — ED PROVIDER NOTE - CLINICAL SUMMARY MEDICAL DECISION MAKING FREE TEXT BOX
Acute URI with known COVID, associated with nausea and decreased appetite.  No acute hypoxia. Will check labs, x-ray, IV fluids, Zofran, reeval.

## 2023-06-08 NOTE — ED PROVIDER NOTE - PROGRESS NOTE DETAILS
Patient feeling much improved, no acute complaints at this time.  Discussed with patient regarding lab and x-ray findings.  Discussed COVID instructions and precautions, abdominal pain and nausea precautions instructions, and importance of close prompt follow-up with primary care.  We will give Zofran to go home with, and patient will return with any acute changes or concerns.  Patient daughter at bedside.

## 2023-06-08 NOTE — ED PROVIDER NOTE - NSFOLLOWUPINSTRUCTIONS_ED_ALL_ED_FT
1. Follow-up with your Primary Medical Doctor. Call tomorrow for prompt follow-up.  2. Return to Emergency room for any worsening or persistent pain, weakness, fever, dizziness, passing out, difficulty breathing, abdominal pain, unable to eat or drink, or any other concerning symptoms.  3. See attached instruction sheets for additional information, including information regarding signs and symptoms to look out for, reasons to seek immediate care and other important instructions.  4.  Plenty of fluids  5.  Zofran as needed for nausea

## 2023-06-08 NOTE — ED ADULT NURSE NOTE - CHIEF COMPLAINT QUOTE
73 yr old female c/o "I'm covid + since sunday. I'm on [place] Lovid since monday. I only vomited once on sunday, but not since then. I cant seem to eat bc I have no appetite. I'm having trouble tolerating gatorade. I have been having bad diarrhea. EMS said I was dehydrated and needed fluids." Pt denies fever, NV, cough, sob, CP.

## 2023-06-08 NOTE — ED PROVIDER NOTE - CARE PLAN
1 Principal Discharge DX:	2019 novel coronavirus disease (COVID-19)  Secondary Diagnosis:	Dehydration

## 2023-06-08 NOTE — ED PROVIDER NOTE - PATIENT PORTAL LINK FT
You can access the FollowMyHealth Patient Portal offered by [facility] by registering at the following website: [email or web address]. By joining [facility]’s FollowMyHealth portal, you will also be able to view your health information using other applications (apps) compatible with our system.

## 2023-06-08 NOTE — ED ADULT TRIAGE NOTE - CHIEF COMPLAINT QUOTE
73 yr old female c/o "I'm covid + since sunday. I'm on [place] Lovid since monday. I only vomited once on sunday, but not since then. I cant seem to eat bc I have no appetite. I'm having trouble tolerating gatorade. I have been having bad diarrhea. EMS said I was dehydrated and needed fluids." Pt denies fever, NV, cough, sob, CP.

## 2023-06-08 NOTE — ED PROVIDER NOTE - CARE PROVIDER_API CALL
Chloe Rodriguez  Internal Medicine  [address] [place] [address] [address]  [place] [address]  Phone: [phone]  Fax: [phone]  Follow Up Time:

## 2023-06-23 ENCOUNTER — APPOINTMENT (OUTPATIENT)
Dept: RHEUMATOLOGY | Facility: CLINIC | Age: 73
End: 2023-06-23
Payer: COMMERCIAL

## 2023-06-23 VITALS
DIASTOLIC BLOOD PRESSURE: 70 MMHG | HEART RATE: 69 BPM | OXYGEN SATURATION: 98 % | SYSTOLIC BLOOD PRESSURE: 108 MMHG | RESPIRATION RATE: 18 BRPM

## 2023-06-23 VITALS
DIASTOLIC BLOOD PRESSURE: 66 MMHG | OXYGEN SATURATION: 94 % | TEMPERATURE: 96.7 F | RESPIRATION RATE: 18 BRPM | SYSTOLIC BLOOD PRESSURE: 100 MMHG | HEART RATE: 70 BPM

## 2023-06-23 PROCEDURE — 36415 COLL VENOUS BLD VENIPUNCTURE: CPT

## 2023-06-23 PROCEDURE — 96413 CHEMO IV INFUSION 1 HR: CPT

## 2023-06-23 RX ORDER — ABATACEPT 250 MG/15ML
250 INJECTION, POWDER, LYOPHILIZED, FOR SOLUTION INTRAVENOUS
Qty: 0 | Refills: 0 | Status: COMPLETED
Start: 2023-04-17

## 2023-06-23 RX ORDER — ACETAMINOPHEN 325 MG/1
325 TABLET ORAL
Qty: 0 | Refills: 0 | Status: COMPLETED
Start: 2022-09-23

## 2023-06-23 NOTE — HISTORY OF PRESENT ILLNESS
[Denies] : Denies [Yes] : Yes [Declined] : Declined [TB] : Tuberculosis screening [Hep acute panel] : Hepatitis acute panel [Right upper extremity] : Right upper extremity [24g] : 24g [Start Time: ___] : Medication Start Time: [unfilled] [End Time: ___] : Medication End Time: [unfilled] [IV discontinued. Intact. No signs or symptoms of IV complications noted. Time: ___] : IV discontinued. Intact. No signs or symptoms of IV complications noted. Time: [unfilled] [Patient  instructed to seek medical attention with signs and symptoms of adverse effects] : Patient  instructed to seek medical attention with signs and symptoms of adverse effects [Patient left unit in no acute distress] : Patient left unit in no acute distress [Medications administered as ordered and tolerated well.] : Medications administered as ordered and tolerated well. [Blood drawn at time of visit] : Blood drawn at time of visit [de-identified] : Reports previous falls at home.  [de-identified] : 08:40 am

## 2023-06-26 LAB
ALBUMIN SERPL ELPH-MCNC: 3.9 G/DL
ALP BLD-CCNC: 70 U/L
ALT SERPL-CCNC: 7 U/L
ANION GAP SERPL CALC-SCNC: 11 MMOL/L
AST SERPL-CCNC: 16 U/L
BILIRUB SERPL-MCNC: 0.3 MG/DL
BUN SERPL-MCNC: 13 MG/DL
CALCIUM SERPL-MCNC: 9.6 MG/DL
CHLORIDE SERPL-SCNC: 109 MMOL/L
CO2 SERPL-SCNC: 22 MMOL/L
CREAT SERPL-MCNC: 0.68 MG/DL
CRP SERPL-MCNC: <3 MG/L
EGFR: 93 ML/MIN/1.73M2
ERYTHROCYTE [SEDIMENTATION RATE] IN BLOOD BY WESTERGREN METHOD: 37 MM/HR
GLUCOSE SERPL-MCNC: 78 MG/DL
POTASSIUM SERPL-SCNC: 4.5 MMOL/L
PROT SERPL-MCNC: 6.1 G/DL
SODIUM SERPL-SCNC: 143 MMOL/L

## 2023-06-27 ENCOUNTER — APPOINTMENT (OUTPATIENT)
Dept: RHEUMATOLOGY | Facility: CLINIC | Age: 73
End: 2023-06-27
Payer: COMMERCIAL

## 2023-06-27 DIAGNOSIS — R60.0 LOCALIZED EDEMA: ICD-10-CM

## 2023-06-27 DIAGNOSIS — R60.9 EDEMA, UNSPECIFIED: ICD-10-CM

## 2023-06-27 PROCEDURE — 99442: CPT

## 2023-06-28 PROBLEM — R60.9 SWELLING: Status: ACTIVE | Noted: 2023-06-28

## 2023-06-28 PROBLEM — R60.0 LOWER EXTREMITY EDEMA: Status: ACTIVE | Noted: 2023-05-24

## 2023-07-06 RX ORDER — ABATACEPT 250 MG/15ML
250 INJECTION, POWDER, LYOPHILIZED, FOR SOLUTION INTRAVENOUS
Qty: 2 | Refills: 0 | Status: COMPLETED | COMMUNITY
Start: 2022-07-22 | End: 2023-05-02

## 2023-07-18 ENCOUNTER — APPOINTMENT (OUTPATIENT)
Dept: RHEUMATOLOGY | Facility: CLINIC | Age: 73
End: 2023-07-18
Payer: COMMERCIAL

## 2023-07-18 VITALS
HEART RATE: 68 BPM | DIASTOLIC BLOOD PRESSURE: 65 MMHG | OXYGEN SATURATION: 96 % | SYSTOLIC BLOOD PRESSURE: 122 MMHG | WEIGHT: 122 LBS | BODY MASS INDEX: 24.6 KG/M2 | HEIGHT: 59 IN | RESPIRATION RATE: 16 BRPM | TEMPERATURE: 96.5 F

## 2023-07-18 PROCEDURE — 99214 OFFICE O/P EST MOD 30 MIN: CPT

## 2023-07-18 NOTE — DATA REVIEWED
"November 19, 2020      Julio Cesar Olmosmoises Cha  [address] [facility] [address]        Dear Julio Cesar,    Thank you for getting your care at [facility]. Please see below for your test results.    Resulted Orders   TSH   Result Value Ref Range    TSH 2.75 0.40 - 4.00 mU/L   Follicle stimulating hormone   Result Value Ref Range    FSH 11.7 IU/L      Comment:      FSH Reference Range  Female: Follicular      2.5-10.2          Mid-cycle       3.4-33.4          Luteal          1.5-9.1          Postmenopausal  23.0-116.3         {[facility] FOLLOW UP [id number]::\"If you have any concerns about these results please call and leave a message for me or send a MyChart message to the clinic.\"}    Sincerely,    Mahogany Basilio MD    "
11/17/2020      RE: Julio Cesar Cha  [address] [facility] [address]         Procedure Note-Nexplanon Removal    Julio Cesar Cha is a patient of Dr. Vegas [facility] here for removal of etonogestrel implant Nexplanon/Implanon    Indication: wanting her Nexplanon removed due to headaches, irritability, poor sleep and lack of menses. Plans to wait for her Depo till her menses start.  in [place]    Consent: Affirmation of informed consent was signed and scanned into the medical record. Risks, benefits and alternatives were discussed. Patient's questions were elicited and answered.   Procedure safety checklist was completed:  Yes  Time Out (Pause for the Cause) completed: Yes      Preoperative Diagnosis: etonogestrel implant  Postoperative Diagnosis: etonogestrel implant removed    Technique: On the left arm  Skin prep Betadine  Anesthesia 1% lidocaine  Procedure: Small incision (<5mm) was made at distal end of palpable implant, curved hemostat was used to isolate the implant and bring it to the incision, the fibrous capsule containing the implant  was incised and the Implant was removed intact.  EBL: minimal  Complications:  No  Tolerance:  Pt tolerated procedure well and was in stable condition.     Contraception was discussed and patient chose the following method none      Follow up: Pt was instructed to call if bleeding, severe pain or foul smell.     As needed       Resident: Mahogany Basilio MD  Faculty: Mahogany Basilio MD present for and supervised this entire procedure.        Mahogany Basilio MD    
November 19, 2020      Litorena Neil Cha  [address] [facility] [address]        Dear Julio Cesar,    Thank you for getting your care at [facility]. Please see below for your test results.  Your Thyroid test is good and your hormone test is good too.   We checked this because you said you were losing some hair and did not feel well. We took your Nexplanon out because of that but we also made sure something else was not going on.     Your results are reassuring.If you have questions please contact the clinic to make an appointment with  me or your primary doctor to discuss the results. [place] Islander translation:santiago curtis kuu gu xaqiijinay jawaabtaadii hadaad suaal qakyawo fermin meier dhaqtayahirka diane batres ah honeyo michael pedersenxhoney jawaabtaada.      Resulted Orders   TSH   Result Value Ref Range    TSH 2.75 0.40 - 4.00 mU/L   Follicle stimulating hormone   Result Value Ref Range    FSH 11.7 IU/L      Comment:      FSH Reference Range  Female: Follicular      2.5-10.2          Mid-cycle       3.4-33.4          Luteal          1.5-9.1          Postmenopausal  23.0-116.3         If you have any concerns about these results please call and leave a message for me or send a MyChart message to the clinic.    Sincerely,    Mahogany Basilio MD    
[FreeTextEntry1] : Paper records reviewed, scanned to EMR. Pertinent labs and imaging summarized in HPI.

## 2023-07-18 NOTE — HISTORY OF PRESENT ILLNESS
[FreeTextEntry1] : KRISHNA DÍAZ is a 72 year old woman who presents with prior dx of RA since age 27, presently on Medrol 2mg/day (chronically on this dose, has not been able to taper lower) and Orencia infusions x 10 years at dose of 1000mg, last dose 3 months as reports insurance has not approved the renewal of the PA. Reports poor response to SQ orencia and lower doses of IV orencia in the past. Denies any SE, no infectious sx with this dose. Denies any severe flares in thel last few years but does report she feels worsening arthralgias in the week just prior to infusions.  Currently reports all day stiffness and some arthralgias in R 3rd PIP, R shoulder, b/l wrists, b/l elbows, b/l knees. R 3rd PIP is more swollen than normal.  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 No personal fractures, no parental fx, no tobacco/ no ETOH / + chronic steroids/ + RA. \par Steady on feet, walking for exercise, dietary Ca, daily Vit D 2K. \par \par + L hip labral tear, some chronic MSK related pain, no limitation to ambulation \par + ocular migraines and some double vision in b/l eyes intermittently x 1 year -- never had inflammatory eye disease \par + ?"scarring on lung from RA" but never changed meds for this, no current pulmonary sx \par + b/l tinnitus \par \par CT T spine - no overt abnormality, no fx \par \par ---------\par 1/18/22 -- Since last visit some loss of hand  strength but moreso 2/2 thumb CMC pain. No active synovitis, effusions, prolonged stiffness, no extra-articular inflammatory sx. No falls, exercising with walking more frequently. Feels well otherwise. \par \par 4/22/22 -- Some finger pain and stiffness, no synovitis, some increased fatigue but able to do all ADLs. Had a traumatic fall 2 weeks ago, no fractures, diffuse myalgias are improving. It did worsen her OA related hip pain but she is able to ambulate and tylenol is helping. No other falls, steady on her feet usually, no plans for upcoming dental work. \par \par 7/22/22 -- Fluctuating R sided mid back pain with radiation to front, when very severe Tylenol and muscle relaxer does not help. Some worsening b/l hip and GTB pain as well as b/l 3rd PIP with milder generalized b/l hand all day achy pain. R shoulder pain with rotating behind her back only. \par \par 3/6/23 -- L hip GTB worsening pain, making ambulation hard at times, mid back pain and spasm, XR without fracture. Achy pain in hands and wrists and more AM stiffness, no carrington synovitis, some numbness b/l in ulnar distribution too but no focal pain over elbows, no extra-articular inflammatory sx. Just had infusion 3 days ago. No infectious sx. \par \par 3/29/23 -- Tripped over rug last week, bruising to L knee, small laceration to L elbow and some trauma to nose - no fractures to limbs or face on XRs/CT. Currently on medrol 4mg which has helped partially with hand and elbow sx but has not fully resolved it despite infusion as well, getting cushingoid facies and some LE edema since being on higher dose medrol. No extra-articular inflammatory sx, no infectious sx. GTB injection was partially helpful, hip feeling better at present. \par \par 5/23/23 -- Stable from RA standpoint, no active joints or no extra-articular inflammatory sx, no infectious sx. Worsening cardiac murmur and edema, but cardiac w/u negative, advised to see if can get off steroids, edema improved with compression stockings. No further falls. \par \par 7/18/23 -- Resumed prednisone 2mg 2/2 recurrence of arthralgias but no synovitis, notes less soft tissue swelling since off Orencia.

## 2023-07-18 NOTE — REVIEW OF SYSTEMS
[Lower Ext Edema] : lower extremity edema [Arthralgias] : arthralgias [Joint Pain] : joint pain [As Noted in HPI] : as noted in HPI [Negative] : Heme/Lymph [Joint Swelling] : no joint swelling [Joint Stiffness] : no joint stiffness

## 2023-07-18 NOTE — PHYSICAL EXAM
[General Appearance - Alert] : alert [General Appearance - In No Acute Distress] : in no acute distress [General Appearance - Well Nourished] : well nourished [Sclera] : the sclera and conjunctiva were normal [PERRL With Normal Accommodation] : pupils were equal in size, round, and reactive to light [Extraocular Movements] : extraocular movements were intact [Outer Ear] : the ears and nose were normal in appearance [Nasal Cavity] : the nasal mucosa and septum were normal [Oropharynx] : the oropharynx was normal [Neck Appearance] : the appearance of the neck was normal [] : no respiratory distress [Auscultation Breath Sounds / Voice Sounds] : lungs were clear to auscultation bilaterally [Heart Rate And Rhythm] : heart rate was normal and rhythm regular [Heart Sounds] : normal S1 and S2 [Murmurs] : no murmurs [Abnormal Walk] : normal gait [Nail Clubbing] : no clubbing  or cyanosis of the fingernails [Motor Tone] : muscle strength and tone were normal [Motor Exam] : the motor exam was normal [No Focal Deficits] : no focal deficits [Oriented To Time, Place, And Person] : oriented to person, place, and time [Impaired Insight] : insight and judgment were intact [Affect] : the affect was normal [FreeTextEntry1] : No synovitis but TTP over b/l MCPs/PIPs and wrists, less TTP over ulnar groove in elbows b/l. OA changes in knees, no effusion. Some mild chronic RA deformities

## 2023-07-21 ENCOUNTER — APPOINTMENT (OUTPATIENT)
Dept: RHEUMATOLOGY | Facility: CLINIC | Age: 73
End: 2023-07-21

## 2023-09-18 NOTE — ED ADULT TRIAGE NOTE - MEANS OF ARRIVAL
No show for the patient. Patient usually does not miss her appts. Was called several times but writer could not reach her.  Left  to call back at [phone] to connect with writer. Has not heard from her this hour. Will call  again at a later time.     stretcher

## 2023-10-19 ENCOUNTER — APPOINTMENT (OUTPATIENT)
Dept: RHEUMATOLOGY | Facility: CLINIC | Age: 73
End: 2023-10-19
Payer: COMMERCIAL

## 2023-10-19 VITALS
RESPIRATION RATE: 14 BRPM | DIASTOLIC BLOOD PRESSURE: 77 MMHG | TEMPERATURE: 98 F | BODY MASS INDEX: 23.99 KG/M2 | WEIGHT: 119 LBS | SYSTOLIC BLOOD PRESSURE: 116 MMHG | HEIGHT: 59 IN | HEART RATE: 70 BPM | OXYGEN SATURATION: 99 %

## 2023-10-19 DIAGNOSIS — M62.830 MUSCLE SPASM OF BACK: ICD-10-CM

## 2023-10-19 DIAGNOSIS — E24.2 DRUG-INDUCED CUSHING'S SYNDROME: ICD-10-CM

## 2023-10-19 DIAGNOSIS — M25.522 PAIN IN LEFT ELBOW: ICD-10-CM

## 2023-10-19 PROCEDURE — 99214 OFFICE O/P EST MOD 30 MIN: CPT

## 2023-10-24 ENCOUNTER — NON-APPOINTMENT (OUTPATIENT)
Age: 73
End: 2023-10-24

## 2023-11-21 ENCOUNTER — APPOINTMENT (OUTPATIENT)
Dept: RHEUMATOLOGY | Facility: CLINIC | Age: 73
End: 2023-11-21
Payer: COMMERCIAL

## 2023-11-21 VITALS
RESPIRATION RATE: 15 BRPM | SYSTOLIC BLOOD PRESSURE: 100 MMHG | HEART RATE: 71 BPM | TEMPERATURE: 98 F | DIASTOLIC BLOOD PRESSURE: 65 MMHG | OXYGEN SATURATION: 98 %

## 2023-11-21 PROCEDURE — 96401 CHEMO ANTI-NEOPL SQ/IM: CPT

## 2023-11-21 RX ORDER — DENOSUMAB 60 MG/ML
60 INJECTION SUBCUTANEOUS
Qty: 1 | Refills: 0 | Status: COMPLETED
Start: 2023-05-02

## 2024-01-02 NOTE — HISTORY OF PRESENT ILLNESS
[FreeTextEntry1] : KRISHNA DÍAZ is a 72 year old woman who presents with prior dx of RA since age 27, presently on Medrol 2mg/day (chronically on this dose, has not been able to taper lower) and Orencia infusions x 10 years at dose of 1000mg, last dose 3 months as reports insurance has not approved the renewal of the PA. Reports poor response to SQ orencia and lower doses of IV orencia in the past. Denies any SE, no infectious sx with this dose. Denies any severe flares in thel last few years but does report she feels worsening arthralgias in the week just prior to infusions.  Currently reports all day stiffness and some arthralgias in R 3rd PIP, R shoulder, b/l wrists, b/l elbows, b/l knees. R 3rd PIP is more swollen than normal.    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 No personal fractures, no parental fx, no tobacco/ no ETOH / + chronic steroids/ + RA.  Steady on feet, walking for exercise, dietary Ca, daily Vit D 2K.   + L hip labral tear, some chronic MSK related pain, no limitation to ambulation  + ocular migraines and some double vision in b/l eyes intermittently x 1 year -- never had inflammatory eye disease  + ?"scarring on lung from RA" but never changed meds for this, no current pulmonary sx  + b/l tinnitus   CT T spine - no overt abnormality, no fx   --------- 1/18/22 -- Since last visit some loss of hand  strength but moreso 2/2 thumb CMC pain. No active synovitis, effusions, prolonged stiffness, no extra-articular inflammatory sx. No falls, exercising with walking more frequently. Feels well otherwise.   4/22/22 -- Some finger pain and stiffness, no synovitis, some increased fatigue but able to do all ADLs. Had a traumatic fall 2 weeks ago, no fractures, diffuse myalgias are improving. It did worsen her OA related hip pain but she is able to ambulate and tylenol is helping. No other falls, steady on her feet usually, no plans for upcoming dental work.   7/22/22 -- Fluctuating R sided mid back pain with radiation to front, when very severe Tylenol and muscle relaxer does not help. Some worsening b/l hip and GTB pain as well as b/l 3rd PIP with milder generalized b/l hand all day achy pain. R shoulder pain with rotating behind her back only.   3/6/23 -- L hip GTB worsening pain, making ambulation hard at times, mid back pain and spasm, XR without fracture. Achy pain in hands and wrists and more AM stiffness, no carrington synovitis, some numbness b/l in ulnar distribution too but no focal pain over elbows, no extra-articular inflammatory sx. Just had infusion 3 days ago. No infectious sx.   3/29/23 -- Tripped over rug last week, bruising to L knee, small laceration to L elbow and some trauma to nose - no fractures to limbs or face on XRs/CT. Currently on medrol 4mg which has helped partially with hand and elbow sx but has not fully resolved it despite infusion as well, getting cushingoid facies and some LE edema since being on higher dose medrol. No extra-articular inflammatory sx, no infectious sx. GTB injection was partially helpful, hip feeling better at present.   5/23/23 -- Stable from RA standpoint, no active joints or no extra-articular inflammatory sx, no infectious sx. Worsening cardiac murmur and edema, but cardiac w/u negative, advised to see if can get off steroids, edema improved with compression stockings. No further falls.   7/18/23 -- Resumed prednisone 2mg 2/2 recurrence of arthralgias but no synovitis, notes less soft tissue swelling since off Orencia, asking if she needs to resume it. no extra-articular inflammatory sx, no infectious sx, no further falls.   10/19/23 -- R sided thoracic pain radiating to front x 1 day, was on long car ride, no trauma. L elbow pain which is limiting use x few weeks, no trauma. Remains on medrol 2mg. Having some hand arthralgias x few weeks as well, no synovitis. No extra-articular inflammatory sx, no further falls.

## 2024-01-02 NOTE — REVIEW OF SYSTEMS
[Joint Pain] : joint pain [Joint Swelling] : no joint swelling [Joint Stiffness] : no joint stiffness

## 2024-01-02 NOTE — ASSESSMENT
[FreeTextEntry1] : KRISHNA DÍAZ is a 73 year old woman with chronic seronegative, reportedly nonerosive RA who has been maintained for the last 10 years on IV high dose Orencia (reportedly not responsive to lower doses) and low dose Medrol 2mg. Currently off Orencia, no active sx today, prior synovitis appears resolved.  # seronegative RA, nonerosive on imaging recently, didn't tolerate tapering entirely off steroids, now on low dose with less pain and no activity, ?edema 2/2 Orencia so off without worsening  - c/w medrol 2mg/day for now  - will continue to hold Orencia and see if she has active joints and needs alternative biologic or can be maintained on low dose steroids vs ?DMARD trial. - Aug labs reviewed, repeat labs as below  - check CT L elbow to eval for synovitis   # steroid induced OP, tolerating Prolia well, no further falls - imaging negative for fx - MIN appt for next prolia in Nov - c/w dietary Ca goal 600mg BID with food, Vit D 2000 IU daily. - C/w weight bearing exercise for 30min 3-4x/week to maintain BMD - reviewed recent DEXA - stable, repeat DEXA 7/2025  # L hip pain s/p prior labral tear, GTB pain improved with injection, ongoing thoracic spasm -- less active - c/w tylenol and ROM exercises - c/w use of heating pad and massage for spasm  RTC in 4 months

## 2024-01-02 NOTE — PHYSICAL EXAM
[FreeTextEntry1] : No synovitis but TTP over L elbow true joint with limited ROM but no warmth, OA changes in knees, no effusion. Some mild chronic RA deformities

## 2024-01-15 RX ORDER — ABATACEPT 250 MG/15ML
250 INJECTION, POWDER, LYOPHILIZED, FOR SOLUTION INTRAVENOUS
Refills: 0 | Status: ACTIVE | COMMUNITY
Start: 2021-07-08

## 2024-02-12 ENCOUNTER — EMERGENCY (EMERGENCY)
Facility: HOSPITAL | Age: 74
LOS: 1 days | Discharge: HOME | End: 2024-02-12
Attending: EMERGENCY MEDICINE | Admitting: EMERGENCY MEDICINE
Payer: COMMERCIAL

## 2024-02-12 VITALS
TEMPERATURE: 98 F | RESPIRATION RATE: 18 BRPM | HEART RATE: 98 BPM | SYSTOLIC BLOOD PRESSURE: 110 MMHG | OXYGEN SATURATION: 98 % | DIASTOLIC BLOOD PRESSURE: 68 MMHG

## 2024-02-12 VITALS
WEIGHT: 118.61 LBS | DIASTOLIC BLOOD PRESSURE: 68 MMHG | HEART RATE: 104 BPM | RESPIRATION RATE: 18 BRPM | OXYGEN SATURATION: 98 % | SYSTOLIC BLOOD PRESSURE: 94 MMHG | TEMPERATURE: 97 F | HEIGHT: 59.25 IN

## 2024-02-12 LAB
ALBUMIN SERPL ELPH-MCNC: 3.3 G/DL — SIGNIFICANT CHANGE UP (ref 3.3–5)
ALP SERPL-CCNC: 59 U/L — SIGNIFICANT CHANGE UP (ref 40–120)
ALT FLD-CCNC: 13 U/L — SIGNIFICANT CHANGE UP (ref 12–78)
ANION GAP SERPL CALC-SCNC: 10 MMOL/L — SIGNIFICANT CHANGE UP (ref 5–17)
AST SERPL-CCNC: 30 U/L — SIGNIFICANT CHANGE UP (ref 15–37)
BASOPHILS # BLD AUTO: 0.03 K/UL — SIGNIFICANT CHANGE UP (ref 0–0.2)
BASOPHILS NFR BLD AUTO: 0.4 % — SIGNIFICANT CHANGE UP (ref 0–2)
BILIRUB SERPL-MCNC: 0.9 MG/DL — SIGNIFICANT CHANGE UP (ref 0.2–1.2)
BUN SERPL-MCNC: 12 MG/DL — SIGNIFICANT CHANGE UP (ref 7–23)
CALCIUM SERPL-MCNC: 9.4 MG/DL — SIGNIFICANT CHANGE UP (ref 8.5–10.1)
CHLORIDE SERPL-SCNC: 107 MMOL/L — SIGNIFICANT CHANGE UP (ref 96–108)
CO2 SERPL-SCNC: 21 MMOL/L — LOW (ref 22–31)
CREAT SERPL-MCNC: 0.85 MG/DL — SIGNIFICANT CHANGE UP (ref 0.5–1.3)
EGFR: 73 ML/MIN/1.73M2 — SIGNIFICANT CHANGE UP
EOSINOPHIL # BLD AUTO: 0.04 K/UL — SIGNIFICANT CHANGE UP (ref 0–0.5)
EOSINOPHIL NFR BLD AUTO: 0.6 % — SIGNIFICANT CHANGE UP (ref 0–6)
FLUAV AG NPH QL: SIGNIFICANT CHANGE UP
FLUBV AG NPH QL: SIGNIFICANT CHANGE UP
GLUCOSE SERPL-MCNC: 101 MG/DL — HIGH (ref 70–99)
HCT VFR BLD CALC: 43.7 % — SIGNIFICANT CHANGE UP (ref 34.5–45)
HGB BLD-MCNC: 14.1 G/DL — SIGNIFICANT CHANGE UP (ref 11.5–15.5)
IMM GRANULOCYTES NFR BLD AUTO: 0.4 % — SIGNIFICANT CHANGE UP (ref 0–0.9)
LIDOCAIN IGE QN: 40 U/L — SIGNIFICANT CHANGE UP (ref 13–75)
LYMPHOCYTES # BLD AUTO: 1.52 K/UL — SIGNIFICANT CHANGE UP (ref 1–3.3)
LYMPHOCYTES # BLD AUTO: 21.2 % — SIGNIFICANT CHANGE UP (ref 13–44)
MAGNESIUM SERPL-MCNC: 2 MG/DL — SIGNIFICANT CHANGE UP (ref 1.6–2.6)
MCHC RBC-ENTMCNC: 28.5 PG — SIGNIFICANT CHANGE UP (ref 27–34)
MCHC RBC-ENTMCNC: 32.3 GM/DL — SIGNIFICANT CHANGE UP (ref 32–36)
MCV RBC AUTO: 88.5 FL — SIGNIFICANT CHANGE UP (ref 80–100)
MONOCYTES # BLD AUTO: 0.9 K/UL — SIGNIFICANT CHANGE UP (ref 0–0.9)
MONOCYTES NFR BLD AUTO: 12.5 % — SIGNIFICANT CHANGE UP (ref 2–14)
NEUTROPHILS # BLD AUTO: 4.66 K/UL — SIGNIFICANT CHANGE UP (ref 1.8–7.4)
NEUTROPHILS NFR BLD AUTO: 64.9 % — SIGNIFICANT CHANGE UP (ref 43–77)
NRBC # BLD: 0 /100 WBCS — SIGNIFICANT CHANGE UP (ref 0–0)
PLATELET # BLD AUTO: 255 K/UL — SIGNIFICANT CHANGE UP (ref 150–400)
POTASSIUM SERPL-MCNC: 3.5 MMOL/L — SIGNIFICANT CHANGE UP (ref 3.5–5.3)
POTASSIUM SERPL-SCNC: 3.5 MMOL/L — SIGNIFICANT CHANGE UP (ref 3.5–5.3)
PROT SERPL-MCNC: 7.1 G/DL — SIGNIFICANT CHANGE UP (ref 6–8.3)
RBC # BLD: 4.94 M/UL — SIGNIFICANT CHANGE UP (ref 3.8–5.2)
RBC # FLD: 13.6 % — SIGNIFICANT CHANGE UP (ref 10.3–14.5)
RSV RNA NPH QL NAA+NON-PROBE: SIGNIFICANT CHANGE UP
SARS-COV-2 RNA SPEC QL NAA+PROBE: SIGNIFICANT CHANGE UP
SODIUM SERPL-SCNC: 138 MMOL/L — SIGNIFICANT CHANGE UP (ref 135–145)
WBC # BLD: 7.18 K/UL — SIGNIFICANT CHANGE UP (ref 3.8–10.5)
WBC # FLD AUTO: 7.18 K/UL — SIGNIFICANT CHANGE UP (ref 3.8–10.5)

## 2024-02-12 PROCEDURE — 87637 SARSCOV2&INF A&B&RSV AMP PRB: CPT

## 2024-02-12 PROCEDURE — 85025 COMPLETE CBC W/AUTO DIFF WBC: CPT

## 2024-02-12 PROCEDURE — 83735 ASSAY OF MAGNESIUM: CPT

## 2024-02-12 PROCEDURE — 96375 TX/PRO/DX INJ NEW DRUG ADDON: CPT

## 2024-02-12 PROCEDURE — 36415 COLL VENOUS BLD VENIPUNCTURE: CPT

## 2024-02-12 PROCEDURE — 99284 EMERGENCY DEPT VISIT MOD MDM: CPT | Mod: 25

## 2024-02-12 PROCEDURE — 80053 COMPREHEN METABOLIC PANEL: CPT

## 2024-02-12 PROCEDURE — 83690 ASSAY OF LIPASE: CPT

## 2024-02-12 PROCEDURE — 96374 THER/PROPH/DIAG INJ IV PUSH: CPT

## 2024-02-12 PROCEDURE — 99284 EMERGENCY DEPT VISIT MOD MDM: CPT

## 2024-02-12 RX ORDER — METOCLOPRAMIDE HCL 10 MG
10 TABLET ORAL ONCE
Refills: 0 | Status: DISCONTINUED | OUTPATIENT
Start: 2024-02-12 | End: 2024-02-12

## 2024-02-12 RX ORDER — DIPHENHYDRAMINE HCL 50 MG
25 CAPSULE ORAL ONCE
Refills: 0 | Status: COMPLETED | OUTPATIENT
Start: 2024-02-12 | End: 2024-02-12

## 2024-02-12 RX ORDER — SODIUM CHLORIDE 9 MG/ML
1000 INJECTION INTRAMUSCULAR; INTRAVENOUS; SUBCUTANEOUS ONCE
Refills: 0 | Status: COMPLETED | OUTPATIENT
Start: 2024-02-12 | End: 2024-02-12

## 2024-02-12 RX ORDER — ONDANSETRON 8 MG/1
1 TABLET, FILM COATED ORAL
Qty: 10 | Refills: 0
Start: 2024-02-12 | End: 2024-02-14

## 2024-02-12 RX ORDER — METOCLOPRAMIDE HCL 10 MG
10 TABLET ORAL ONCE
Refills: 0 | Status: COMPLETED | OUTPATIENT
Start: 2024-02-12 | End: 2024-02-12

## 2024-02-12 RX ORDER — PANTOPRAZOLE SODIUM 20 MG/1
40 TABLET, DELAYED RELEASE ORAL ONCE
Refills: 0 | Status: COMPLETED | OUTPATIENT
Start: 2024-02-12 | End: 2024-02-12

## 2024-02-12 RX ORDER — ONDANSETRON 8 MG/1
4 TABLET, FILM COATED ORAL ONCE
Refills: 0 | Status: COMPLETED | OUTPATIENT
Start: 2024-02-12 | End: 2024-02-12

## 2024-02-12 RX ORDER — ACETAMINOPHEN 500 MG
1000 TABLET ORAL ONCE
Refills: 0 | Status: COMPLETED | OUTPATIENT
Start: 2024-02-12 | End: 2024-02-12

## 2024-02-12 RX ADMIN — Medication 10 MILLIGRAM(S): at 13:03

## 2024-02-12 RX ADMIN — PANTOPRAZOLE SODIUM 40 MILLIGRAM(S): 20 TABLET, DELAYED RELEASE ORAL at 11:25

## 2024-02-12 RX ADMIN — SODIUM CHLORIDE 1000 MILLILITER(S): 9 INJECTION INTRAMUSCULAR; INTRAVENOUS; SUBCUTANEOUS at 11:25

## 2024-02-12 RX ADMIN — ONDANSETRON 4 MILLIGRAM(S): 8 TABLET, FILM COATED ORAL at 11:25

## 2024-02-12 RX ADMIN — Medication 400 MILLIGRAM(S): at 12:54

## 2024-02-12 NOTE — ED PROVIDER NOTE - NSFOLLOWUPINSTRUCTIONS_ED_ALL_ED_FT
Continue to stay well hydrated  Follow up with your primary doctor in 1-2 days.  Return to the ED immediately for new or worsening symptoms as we discussed.      Viral Gastroenteritis, Adult  Body outline showing the digestive tract, including the stomach, small intestine, and large intestine.  Viral gastroenteritis is also known as the stomach flu. This condition may affect your stomach, small intestine, and large intestine. It can cause sudden watery diarrhea, fever, and vomiting. This condition is caused by many different viruses. These viruses can be passed from person to person very easily (are contagious).    Diarrhea and vomiting can make you feel weak and cause you to become dehydrated. You may not be able to keep fluids down. Dehydration can make you tired and thirsty, cause you to have a dry mouth, and decrease how often you urinate. It is important to replace the fluids that you lose from diarrhea and vomiting.    What are the causes?  Gastroenteritis is caused by many viruses, including rotavirus and norovirus. Norovirus is the most common cause in adults. You can get sick after being exposed to the viruses from other people. You can also get sick by:  Eating food, drinking water, or touching a surface contaminated with one of these viruses.  Sharing utensils or other personal items with an infected person.  What increases the risk?  You are more likely to develop this condition if you:  Have a weak body defense system (immune system).  Live with one or more children who are younger than 2 years.  Live in a nursing home.  Travel on cruise ships.  What are the signs or symptoms?  Symptoms of this condition start suddenly 1–3 days after exposure to a virus. Symptoms may last for a few days or for as long as a week. Common symptoms include watery diarrhea and vomiting. Other symptoms include:  Fever.  Headache.  Fatigue.  Pain in the abdomen.  Chills.  Weakness.  Nausea.  Muscle aches.  Loss of appetite.  How is this diagnosed?  This condition is diagnosed with a medical history and physical exam. You may also have a stool test to check for viruses or other infections.    How is this treated?  This condition typically goes away on its own. The focus of treatment is to prevent dehydration and restore lost fluids (rehydration). This condition may be treated with:  An oral rehydration solution (ORS) to replace important salts and minerals (electrolytes) in your body. Take this if told by your health care provider. This is a drink that is sold at pharmacies and retail stores.  Medicines to help with your symptoms.  Probiotic supplements to reduce symptoms of diarrhea.  Fluids given through an IV, if dehydration is severe.  Older adults and people with other diseases or a weak immune system are at higher risk for dehydration.    Follow these instructions at home:  Eating and drinking    A comparison of three sample cups showing dark yellow, yellow, and pale yellow urine.  Take an ORS as told by your health care provider.  Drink clear fluids in small amounts as you are able. Clear fluids include:  Water.  Ice chips.  Diluted fruit juice.  Low-calorie sports drinks.  Drink enough fluid to keep your urine pale yellow.  Eat small amounts of healthy foods every 3–4 hours as you are able. This may include whole grains, fruits, vegetables, lean meats, and yogurt.  Avoid fluids that contain a lot of sugar or caffeine, such as energy drinks, sports drinks, and soda.  Avoid spicy or fatty foods.  Avoid alcohol.  General instructions    Washing hands with soap and water.  Wash your hands often, especially after having diarrhea or vomiting. If soap and water are not available, use hand .  Make sure that all people in your household wash their hands well and often.  Take over-the-counter and prescription medicines only as told by your health care provider.  Rest at home while you recover.  Watch your condition for any changes.  Take a warm bath to relieve any burning or pain from frequent diarrhea episodes.  Keep all follow-up visits. This is important.  Contact a health care provider if you:  Cannot keep fluids down.  Have symptoms that get worse.  Have new symptoms.  Feel light-headed or dizzy.  Have muscle cramps.  Get help right away if you:  Have chest pain.  Have trouble breathing or you are breathing very quickly.  Have a fast heartbeat.  Feel extremely weak or you faint.  Have a severe headache, a stiff neck, or both.  Have a rash.  Have severe pain, cramping, or bloating in your abdomen.  Have skin that feels cold and clammy.  Feel confused.  Have pain when you urinate.  Have signs of dehydration, such as:  Dark urine, very little urine, or no urine.  Cracked lips.  Dry mouth.  Sunken eyes.  Sleepiness.  Weakness.  Have signs of bleeding, such as:  Seeing blood in your vomit.  Having vomit that looks like coffee grounds.  Having bloody or black stools or stools that look like tar.  These symptoms may be an emergency. Get help right away. Call 911.  Do not wait to see if the symptoms will go away.  Do not drive yourself to the hospital.  Summary  Viral gastroenteritis is also known as the stomach flu. It can cause sudden watery diarrhea, fever, and vomiting.  This condition can be passed from person to person very easily (is contagious).  Take an oral rehydration solution (ORS) if told by your health care provider. This is a drink that is sold at pharmacies and retail stores.  Wash your hands often, especially after having diarrhea or vomiting. If soap and water are not available, use hand .  This information is not intended to replace advice given to you by your health care provider. Make sure you discuss any questions you have with your health care provider.    Document Revised: 10/17/2022 Document Reviewed: 10/17/2022  [employer] Patient Education © 2023 [employer]  [employer] logo  Terms and Conditions  Privacy Policy  Editorial Policy  All content on this site: Copyright © 2024 Else[employer], its licensors, and contributors. All rights are reserved, including those for text and data mining, AI training, and similar technologies. For all open access content, the Creative Commons licensing terms apply.  Cookies are used by this site. To decline or learn more, visit our Cookies page.  [employer]

## 2024-02-12 NOTE — ED PROVIDER NOTE - PROGRESS NOTE DETAILS
pt reports improvement, tolerating po. workup unremarkable. Discussed the results of all diagnostic testing in ED and copies of all reports given.   Pt was given an opportunity to have all questions answered to satisfaction.  Discussed the importance of prompt, close medical follow-up. ED return precautions discussed at length.  Pt verbalizes agreement and understanding of plan and ED return precautions. Pt well appearing, stable for DC home. No emergent concerns at this time.

## 2024-02-12 NOTE — ED PROVIDER NOTE - OBJECTIVE STATEMENT
72 yo F PMHx RA, HCM, HLD, Hypothyroidism presents to ED c/o N/V/D x yesterday morning. Two nights ago, pt and daughter ate a home prepared meal using recently bought groceries from local grocery store- both became sick 3AM yesterday. Pt unable to tolerate po. Today c/o mild diffuse abd cramping, headache and generalized weakness. Denies dizziness, chest pain, SOB, fever/chills.

## 2024-02-12 NOTE — ED PROVIDER NOTE - PATIENT PORTAL LINK FT
You can access the FollowMyHealth Patient Portal offered by [facility] by registering at the following website: [email or web address]. By joining [facility]’s FollowMyHealth portal, you will also be able to view your health information using other applications (apps) compatible with our system.

## 2024-02-12 NOTE — ED ADULT NURSE NOTE - NSFALLHARMRISKINTERV_ED_ALL_ED

## 2024-02-12 NOTE — ED PROVIDER NOTE - CLINICAL SUMMARY MEDICAL DECISION MAKING FREE TEXT BOX
Leland: pt with n/v/d and headache, feeling much better after ED treatement, stable for dc and outpatient fu

## 2024-02-13 ENCOUNTER — TRANSCRIPTION ENCOUNTER (OUTPATIENT)
Age: 74
End: 2024-02-13

## 2024-03-11 ENCOUNTER — RX RENEWAL (OUTPATIENT)
Age: 74
End: 2024-03-11

## 2024-03-11 RX ORDER — METHYLPREDNISOLONE 4 MG/1
4 TABLET ORAL
Qty: 90 | Refills: 0 | Status: ACTIVE | COMMUNITY
Start: 2024-03-11 | End: 1900-01-01

## 2024-04-09 ENCOUNTER — APPOINTMENT (OUTPATIENT)
Dept: RHEUMATOLOGY | Facility: CLINIC | Age: 74
End: 2024-04-09
Payer: COMMERCIAL

## 2024-04-09 VITALS
OXYGEN SATURATION: 98 % | SYSTOLIC BLOOD PRESSURE: 100 MMHG | HEIGHT: 59 IN | BODY MASS INDEX: 23.79 KG/M2 | RESPIRATION RATE: 16 BRPM | HEART RATE: 80 BPM | DIASTOLIC BLOOD PRESSURE: 68 MMHG | TEMPERATURE: 97.1 F | WEIGHT: 118 LBS

## 2024-04-09 DIAGNOSIS — M06.00 RHEUMATOID ARTHRITIS W/OUT RHEUMATOID FACTOR, UNSPECIFIED SITE: ICD-10-CM

## 2024-04-09 DIAGNOSIS — M15.9 POLYOSTEOARTHRITIS, UNSPECIFIED: ICD-10-CM

## 2024-04-09 DIAGNOSIS — M81.0 AGE-RELATED OSTEOPOROSIS W/OUT CURRENT PATHOLOGICAL FRACTURE: ICD-10-CM

## 2024-04-09 PROCEDURE — G2211 COMPLEX E/M VISIT ADD ON: CPT

## 2024-04-09 PROCEDURE — 99214 OFFICE O/P EST MOD 30 MIN: CPT

## 2024-04-09 NOTE — PHYSICAL EXAM
[General Appearance - Alert] : alert [General Appearance - In No Acute Distress] : in no acute distress [General Appearance - Well Nourished] : well nourished [Sclera] : the sclera and conjunctiva were normal [PERRL With Normal Accommodation] : pupils were equal in size, round, and reactive to light [Extraocular Movements] : extraocular movements were intact [Outer Ear] : the ears and nose were normal in appearance [Neck Appearance] : the appearance of the neck was normal [Auscultation Breath Sounds / Voice Sounds] : lungs were clear to auscultation bilaterally [Heart Rate And Rhythm] : heart rate was normal and rhythm regular [Heart Sounds] : normal S1 and S2 [Murmurs] : no murmurs [No Spinal Tenderness] : no spinal tenderness [Abnormal Walk] : normal gait [Nail Clubbing] : no clubbing  or cyanosis of the fingernails [Motor Tone] : muscle strength and tone were normal [] : no rash [Motor Exam] : the motor exam was normal [No Focal Deficits] : no focal deficits [Oriented To Time, Place, And Person] : oriented to person, place, and time [Impaired Insight] : insight and judgment were intact [Affect] : the affect was normal [No CVA Tenderness] : no ~M costovertebral angle tenderness [FreeTextEntry1] : No synovitis or effusions, ROM diffusely intact, OA changes in knees and scattered PIPs/DIPs.

## 2024-04-09 NOTE — ASSESSMENT
[FreeTextEntry1] : KRISHNA DÍAZ is a 74 year old woman with chronic seronegative, reportedly nonerosive RA who has been maintained for the last 10 years on IV high dose Orencia (reportedly not responsive to lower doses) and low dose Medrol 2mg. Currently off Orencia, no active sx today, prior synovitis appears resolved.  # seronegative RA, nonerosive on imaging recently, didn't tolerate tapering entirely off steroids, now on low dose with less pain and no activity, ?edema 2/2 Orencia so off without worsening  - c/w medrol 2mg/day for now  - will continue to hold Orencia - only if she has active joints will consider alternative biologic vs ?DMARD trial. - CT L elbow with mild OA, no synovitis - pain now resolved  - no labs from RA standpoint given overall good exam   # steroid induced OP, tolerating Prolia well, no further falls - c/w Prolia q6 months with RNs at [facility] - Labs prior to next appt  - Aware to notify us if any plans for dental surgery - c/w dietary Ca goal 600mg BID with food, Vit D 2000 IU daily  - C/w weight bearing exercise for 30min 3-4x/week to maintain BMD - reviewed recent DEXA - stable, repeat DEXA 7/2025  # L hip pain s/p prior labral tear, GTB pain improved with injection, thoracic spasm resolved, stable MSK pain over R lateral chest wall, OA pain in some fingers  - c/w tylenol and ROM exercises - c/w use of heating pad and massage - topical Voltaren gel prn pain up to TID to affected peripheral joints for OA -related pain   RTC in 6 months

## 2024-04-09 NOTE — HISTORY OF PRESENT ILLNESS
[FreeTextEntry1] : KRISHNA DÍAZ is a 72 year old woman who presents with prior dx of RA since age 27, presently on Medrol 2mg/day (chronically on this dose, has not been able to taper lower) and Orencia infusions x 10 years at dose of 1000mg, last dose 3 months as reports insurance has not approved the renewal of the PA. Reports poor response to SQ orencia and lower doses of IV orencia in the past. Denies any SE, no infectious sx with this dose. Denies any severe flares in thel last few years but does report she feels worsening arthralgias in the week just prior to infusions.  Currently reports all day stiffness and some arthralgias in R 3rd PIP, R shoulder, b/l wrists, b/l elbows, b/l knees. R 3rd PIP is more swollen than normal.    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 No personal fractures, no parental fx, no tobacco/ no ETOH / + chronic steroids/ + RA.  Steady on feet, walking for exercise, dietary Ca, daily Vit D 2K.   + L hip labral tear, some chronic MSK related pain, no limitation to ambulation  + ocular migraines and some double vision in b/l eyes intermittently x 1 year -- never had inflammatory eye disease  + ?"scarring on lung from RA" but never changed meds for this, no current pulmonary sx  + b/l tinnitus   CT T spine - no overt abnormality, no fx   --------- 1/18/22 -- Since last visit some loss of hand  strength but moreso 2/2 thumb CMC pain. No active synovitis, effusions, prolonged stiffness, no extra-articular inflammatory sx. No falls, exercising with walking more frequently. Feels well otherwise.   4/22/22 -- Some finger pain and stiffness, no synovitis, some increased fatigue but able to do all ADLs. Had a traumatic fall 2 weeks ago, no fractures, diffuse myalgias are improving. It did worsen her OA related hip pain but she is able to ambulate and tylenol is helping. No other falls, steady on her feet usually, no plans for upcoming dental work.   7/22/22 -- Fluctuating R sided mid back pain with radiation to front, when very severe Tylenol and muscle relaxer does not help. Some worsening b/l hip and GTB pain as well as b/l 3rd PIP with milder generalized b/l hand all day achy pain. R shoulder pain with rotating behind her back only.   3/6/23 -- L hip GTB worsening pain, making ambulation hard at times, mid back pain and spasm, XR without fracture. Achy pain in hands and wrists and more AM stiffness, no carrington synovitis, some numbness b/l in ulnar distribution too but no focal pain over elbows, no extra-articular inflammatory sx. Just had infusion 3 days ago. No infectious sx.   3/29/23 -- Tripped over rug last week, bruising to L knee, small laceration to L elbow and some trauma to nose - no fractures to limbs or face on XRs/CT. Currently on medrol 4mg which has helped partially with hand and elbow sx but has not fully resolved it despite infusion as well, getting cushingoid facies and some LE edema since being on higher dose medrol. No extra-articular inflammatory sx, no infectious sx. GTB injection was partially helpful, hip feeling better at present.   5/23/23 -- Stable from RA standpoint, no active joints or no extra-articular inflammatory sx, no infectious sx. Worsening cardiac murmur and edema, but cardiac w/u negative, advised to see if can get off steroids, edema improved with compression stockings. No further falls.   7/18/23 -- Resumed prednisone 2mg 2/2 recurrence of arthralgias but no synovitis, notes less soft tissue swelling since off Orencia, asking if she needs to resume it. no extra-articular inflammatory sx, no infectious sx, no further falls.   10/19/23 -- R sided thoracic pain radiating to front x 1 day, was on long car ride, no trauma. L elbow pain which is limiting use x few weeks, no trauma. Remains on medrol 2mg. Having some hand arthralgias x few weeks as well, no synovitis. No extra-articular inflammatory sx, no further falls.   4/9/24 -- No synovitis or AM stiffness, no inflammatory rashes, no extra-articular inflammatory sx. OA mediated b/l 3rd PIPs, R shoulder arthralgia and stable MSK pain over R anterior rib - exacerbated by use. No further falls, no plans for dental sx, feeling well.

## 2024-06-05 ENCOUNTER — APPOINTMENT (OUTPATIENT)
Dept: RHEUMATOLOGY | Facility: CLINIC | Age: 74
End: 2024-06-05
Payer: COMMERCIAL

## 2024-06-05 VITALS
SYSTOLIC BLOOD PRESSURE: 109 MMHG | RESPIRATION RATE: 16 BRPM | DIASTOLIC BLOOD PRESSURE: 73 MMHG | HEART RATE: 71 BPM | TEMPERATURE: 97.8 F | OXYGEN SATURATION: 97 %

## 2024-06-05 PROCEDURE — 96401 CHEMO ANTI-NEOPL SQ/IM: CPT

## 2024-06-05 RX ORDER — DENOSUMAB 60 MG/ML
60 INJECTION SUBCUTANEOUS
Qty: 1 | Refills: 0 | Status: ACTIVE | COMMUNITY
Start: 2022-01-19

## 2024-06-05 RX ORDER — DENOSUMAB 60 MG/ML
60 INJECTION SUBCUTANEOUS
Qty: 1 | Refills: 0 | Status: COMPLETED | OUTPATIENT
Start: 2024-05-30

## 2024-06-18 NOTE — ED ADULT NURSE NOTE - CAS EDP DISCH TYPE
Caller: Annabella Rodriguez    
Home

## 2024-10-18 ENCOUNTER — APPOINTMENT (OUTPATIENT)
Dept: RHEUMATOLOGY | Facility: CLINIC | Age: 74
End: 2024-10-18

## 2024-10-18 VITALS
HEART RATE: 70 BPM | RESPIRATION RATE: 17 BRPM | BODY MASS INDEX: 24.19 KG/M2 | OXYGEN SATURATION: 99 % | TEMPERATURE: 97.2 F | SYSTOLIC BLOOD PRESSURE: 110 MMHG | HEIGHT: 59 IN | DIASTOLIC BLOOD PRESSURE: 72 MMHG | WEIGHT: 120 LBS
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
## 2024-10-18 DIAGNOSIS — M15.9 POLYOSTEOARTHRITIS, UNSPECIFIED: ICD-10-CM

## 2024-10-18 DIAGNOSIS — M81.0 AGE-RELATED OSTEOPOROSIS W/OUT CURRENT PATHOLOGICAL FRACTURE: ICD-10-CM

## 2024-10-18 DIAGNOSIS — M06.00 RHEUMATOID ARTHRITIS W/OUT RHEUMATOID FACTOR, UNSPECIFIED SITE: ICD-10-CM

## 2024-10-18 PROCEDURE — 99214 OFFICE O/P EST MOD 30 MIN: CPT

## 2024-10-18 PROCEDURE — G2211 COMPLEX E/M VISIT ADD ON: CPT | Mod: NC

## 2024-12-10 ENCOUNTER — APPOINTMENT (OUTPATIENT)
Dept: RHEUMATOLOGY | Facility: CLINIC | Age: 74
End: 2024-12-10
Payer: COMMERCIAL

## 2024-12-10 ENCOUNTER — MED ADMIN CHARGE (OUTPATIENT)
Age: 74
End: 2024-12-10

## 2024-12-10 VITALS
OXYGEN SATURATION: 99 % | SYSTOLIC BLOOD PRESSURE: 127 MMHG | TEMPERATURE: 96.9 F | RESPIRATION RATE: 18 BRPM | HEART RATE: 68 BPM | DIASTOLIC BLOOD PRESSURE: 73 MMHG

## 2024-12-10 PROCEDURE — 96401 CHEMO ANTI-NEOPL SQ/IM: CPT

## 2024-12-10 RX ORDER — DENOSUMAB 60 MG/ML
60 INJECTION SUBCUTANEOUS
Qty: 1 | Refills: 0 | Status: COMPLETED | OUTPATIENT
Start: 2024-12-03

## 2025-04-22 ENCOUNTER — APPOINTMENT (OUTPATIENT)
Dept: RHEUMATOLOGY | Facility: CLINIC | Age: 75
End: 2025-04-22
Payer: COMMERCIAL

## 2025-04-22 VITALS
HEART RATE: 69 BPM | RESPIRATION RATE: 18 BRPM | TEMPERATURE: 97.3 F | OXYGEN SATURATION: 97 % | HEIGHT: 59 IN | SYSTOLIC BLOOD PRESSURE: 100 MMHG | DIASTOLIC BLOOD PRESSURE: 70 MMHG

## 2025-04-22 DIAGNOSIS — M81.0 AGE-RELATED OSTEOPOROSIS W/OUT CURRENT PATHOLOGICAL FRACTURE: ICD-10-CM

## 2025-04-22 DIAGNOSIS — G89.29 PAIN IN RIGHT SHOULDER: ICD-10-CM

## 2025-04-22 DIAGNOSIS — M25.522 PAIN IN LEFT ELBOW: ICD-10-CM

## 2025-04-22 DIAGNOSIS — M25.552 PAIN IN LEFT HIP: ICD-10-CM

## 2025-04-22 DIAGNOSIS — M15.9 POLYOSTEOARTHRITIS, UNSPECIFIED: ICD-10-CM

## 2025-04-22 DIAGNOSIS — M06.00 RHEUMATOID ARTHRITIS W/OUT RHEUMATOID FACTOR, UNSPECIFIED SITE: ICD-10-CM

## 2025-04-22 DIAGNOSIS — M25.511 PAIN IN RIGHT SHOULDER: ICD-10-CM

## 2025-04-22 DIAGNOSIS — M54.2 CERVICALGIA: ICD-10-CM

## 2025-04-22 PROCEDURE — G2211 COMPLEX E/M VISIT ADD ON: CPT | Mod: NC

## 2025-04-22 PROCEDURE — 99214 OFFICE O/P EST MOD 30 MIN: CPT

## 2025-06-12 RX ORDER — DENOSUMAB 60 MG/ML
60 INJECTION SUBCUTANEOUS
Qty: 1 | Refills: 0 | Status: ACTIVE | OUTPATIENT
Start: 2025-06-12 | End: 1900-01-01

## 2025-06-17 ENCOUNTER — MED ADMIN CHARGE (OUTPATIENT)
Age: 75
End: 2025-06-17

## 2025-06-17 ENCOUNTER — APPOINTMENT (OUTPATIENT)
Dept: RHEUMATOLOGY | Facility: CLINIC | Age: 75
End: 2025-06-17
Payer: COMMERCIAL

## 2025-06-17 VITALS
SYSTOLIC BLOOD PRESSURE: 103 MMHG | RESPIRATION RATE: 17 BRPM | OXYGEN SATURATION: 97 % | TEMPERATURE: 97.4 F | HEART RATE: 70 BPM | DIASTOLIC BLOOD PRESSURE: 68 MMHG

## 2025-06-17 PROCEDURE — 96401 CHEMO ANTI-NEOPL SQ/IM: CPT

## 2025-06-17 RX ORDER — DENOSUMAB 60 MG/ML
60 INJECTION SUBCUTANEOUS
Qty: 1 | Refills: 0 | Status: COMPLETED
Start: 2025-06-12

## 2025-07-22 ENCOUNTER — NON-APPOINTMENT (OUTPATIENT)
Age: 75
End: 2025-07-22